# Patient Record
Sex: FEMALE | Race: BLACK OR AFRICAN AMERICAN | NOT HISPANIC OR LATINO | URBAN - METROPOLITAN AREA
[De-identification: names, ages, dates, MRNs, and addresses within clinical notes are randomized per-mention and may not be internally consistent; named-entity substitution may affect disease eponyms.]

---

## 2020-06-04 ENCOUNTER — OFFICE VISIT (OUTPATIENT)
Dept: URBAN - METROPOLITAN AREA CLINIC 84 | Facility: CLINIC | Age: 75
End: 2020-06-04
Payer: MEDICARE

## 2020-06-04 DIAGNOSIS — K21.9 GERD: ICD-10-CM

## 2020-06-04 DIAGNOSIS — R13.10 DYSPHAGIA: ICD-10-CM

## 2020-06-04 DIAGNOSIS — K59.01 SLOW TRANSIT CONSTIPATION: ICD-10-CM

## 2020-06-04 DIAGNOSIS — R10.13 EPIGASTRIC ABDOMINAL PAIN: ICD-10-CM

## 2020-06-04 PROCEDURE — G9903 PT SCRN TBCO ID AS NON USER: HCPCS | Performed by: INTERNAL MEDICINE

## 2020-06-04 PROCEDURE — 99214 OFFICE O/P EST MOD 30 MIN: CPT | Performed by: INTERNAL MEDICINE

## 2020-06-04 PROCEDURE — G8427 DOCREV CUR MEDS BY ELIG CLIN: HCPCS | Performed by: INTERNAL MEDICINE

## 2020-06-04 PROCEDURE — 1036F TOBACCO NON-USER: CPT | Performed by: INTERNAL MEDICINE

## 2020-06-04 PROCEDURE — G8417 CALC BMI ABV UP PARAM F/U: HCPCS | Performed by: INTERNAL MEDICINE

## 2020-06-04 RX ORDER — ASPIRIN 81 MG/1
CHEW 1 TABLET (81 MG) BY ORAL ROUTE ONCE DAILY TABLET, CHEWABLE ORAL 1
Qty: 0 | Refills: 0 | Status: ACTIVE | COMMUNITY
Start: 1900-01-01 | End: 1900-01-01

## 2020-06-04 RX ORDER — AMLODIPINE BESYLATE 2.5 MG/1
TAKE 1 TABLET (2.5 MG) BY ORAL ROUTE ONCE DAILY TABLET ORAL 1
Qty: 0 | Refills: 0 | Status: ACTIVE | COMMUNITY
Start: 1900-01-01 | End: 1900-01-01

## 2020-06-04 RX ORDER — ESTROGENS, CONJUGATED 0.62 MG/1
TAKE 1 TABLET (0.625 MG) BY ORAL ROUTE ONCE DAILY TABLET, FILM COATED ORAL 1
Qty: 0 | Refills: 0 | Status: ACTIVE | COMMUNITY
Start: 1900-01-01 | End: 1900-01-01

## 2020-06-04 RX ORDER — DEXLANSOPRAZOLE 60 MG/1
TAKE 1 CAPSULE (60 MG) BY ORAL ROUTE ONCE DAILY CAPSULE, DELAYED RELEASE ORAL 1
Qty: 0 | Refills: 0 | Status: ACTIVE | COMMUNITY
Start: 1900-01-01 | End: 1900-01-01

## 2020-06-04 RX ORDER — ATORVASTATIN CALCIUM 20 MG/1
TAKE 1 TABLET (20 MG) BY ORAL ROUTE ONCE DAILY TABLET, FILM COATED ORAL 1
Qty: 0 | Refills: 0 | Status: ACTIVE | COMMUNITY
Start: 1900-01-01 | End: 1900-01-01

## 2020-06-04 RX ORDER — LINACLOTIDE 145 UG/1
TAKE ONE CAPSULE BY MOUTH EVERY DAY ON AN EMPTY STOMACH AT LEAST 30 MINUTES BEFORE FIRST MEAL OF THE DAY CAPSULE, GELATIN COATED ORAL
Qty: 90 | Refills: 1 | Status: ACTIVE | COMMUNITY
Start: 2018-10-15 | End: 1900-01-01

## 2020-06-04 NOTE — HPI-OTHER HISTORIES
Oct 2019 - Dysphagia has improved with change in diet. Continues dexilant once daily. Linzess helps with constipation. No abdominal pain. Regular bowels. Stable weight. Labs  Sept 2019: CR 1. CT abdominal with contrast Sept 2019: No acute inflammatory process within the abdomen or pelvis. Atrophied right kidney for which she is scheduled to see a urologist.  EGD Sept 2019: - Esophagogastric landmarks identified. - No gross lesions in esophagus. Dilated 18 mm. - No gross lesions in esophagus. Biopsied. Path revealed lichenoid mucositis. - No gross lesions in the stomach. Biopsied. Path revealed PPI therapy effect. - Normal examined duodenum.   Sept 2019 - Four days ago acquired acute onset upper abdominal discomfort which lasted for 6 hours and then resolved. Eating foods causes discomfort. Brown stools. Remains on PPI. No recent NSAID use.   March 2019 - on dexilant for gerd. takes linzess 145 for constipation. no new symptoms. has tried to stop PPI but this causes severe gerd related symptoms.   NOV 2018 - EGD Oct 2018: normal. Gastric biopsy revealed intestinal metaplasia. Reflux esophagitis present. US abdomen: atrophic right kidney, gallbladder is normal, no stones. Patient remains on Dexilant despite which gets break through heartburn. She also reports episodic abdominal discomfort. She continues to remain on Linzess.   JULY 2018 - Patient was admitted to [facility] July 2018 with chest pain. She was near syncope. She underwent cardiac work-up which was negative. Labs: hgb 13, LFT's normal. JAN 2018 - Patient is presently on Dexilant 30 mg with relief in GERD. She is also using Linzess 145 mcg daily with good relief in constipation and no rectal bleeding at present time.  11/2017 - All three hemorrhoid plexus have been banded. Linzess 72 mcg was ineffective and therefore was increased to 145 mcg. Patient reports 1 episode of copious rectal bleeding. Improved bowel frequency with linzess use but occasionally no bowel movements despite taking medication. Heartburn has worsened but ran out of medications (Dexilant).   09/2017 - Previously banded left lateral and right posterior. Here for repeat banding. Linzess 72 mcg is not helping with constipation.   08/2017 - Patient reports intermittent heartburn. She is on Dexilant 30 mg once daily. She reports frequent abdominal bloating and has chronic constipation for which she uses Miralax 2-3 times a day. Patient is interested in hemorrhoid banding procedure.   03/2017 - The patient has been experiencing these symptoms for years. The heartburn lasts a variable amount of time. The patient has used medications to relieve the heartburn. Medications used include Dexilant with adequate relief. Current medications include Dexilant 60 mg oral capsule,biphase delayed releas. She has had extensive workup in the past which includes EGD in 2012, H Pylori infection s/p treatment, HBT - no SIBO, US Abdomen - normal. CT Abdomen without contrast - normal. Celiac panel - negative.   Esophageal manometry 2010 - also normal. Patient reports intolerance to Carfate in past. Adding Zantac also did not help with symptoms. Symptoms were refractory to Nexium 40 mg. However Dexilant 60 mg seems to be helping.   Nov 2015 EGD with 48 hr B[facility] PH monitoring revealed - Mild chronic gastritis, No H Pylori. No esophagitis. But Bravo Ph monitoring confirmed prescence of acid reflux. This was done when pt was off PPI therapy ( though pt was instructed to continue all meds during test she stopped PPI therapy 2 weeks prior to procedure) Constipation is wellcontrolled with Miralax once daily.   Colonoscopy March 2016 - poor prep Colonoscopy May 2016 - Internal hemorrhoids that prolapse with straining, but spontaneously regress to the resting position (Grade II) found on perianal exam. - Nonbleeding internal hemorrhoids. - No specimens collected Heartburn is well controlled with Dexilant 60 mg. She reports abdominal distension and bloating especially after meals.

## 2020-06-04 NOTE — PHYSICAL EXAM CONSTITUTIONAL:
well developed, well nourished , in no acute distress , ambulating without difficulty , normal communication ability no

## 2020-06-04 NOTE — HPI-TODAY'S VISIT:
feels dysphagia, epigastric discomfort. chest spasms. not always with eating. also gets shortness of breath after eating. remains on dexilant. regular bowels. using linzess.

## 2020-06-15 ENCOUNTER — TELEPHONE ENCOUNTER (OUTPATIENT)
Dept: URBAN - METROPOLITAN AREA CLINIC 25 | Facility: CLINIC | Age: 75
End: 2020-06-15

## 2020-06-17 ENCOUNTER — OFFICE VISIT (OUTPATIENT)
Dept: URBAN - METROPOLITAN AREA SURGERY CENTER 16 | Facility: SURGERY CENTER | Age: 75
End: 2020-06-17
Payer: MEDICARE

## 2020-06-17 ENCOUNTER — TELEPHONE ENCOUNTER (OUTPATIENT)
Dept: URBAN - METROPOLITAN AREA CLINIC 92 | Facility: CLINIC | Age: 75
End: 2020-06-17

## 2020-06-17 ENCOUNTER — CLAIMS CREATED FROM THE CLAIM WINDOW (OUTPATIENT)
Dept: URBAN - METROPOLITAN AREA CLINIC 4 | Facility: CLINIC | Age: 75
End: 2020-06-17
Payer: MEDICARE

## 2020-06-17 DIAGNOSIS — R13.19 CERVICAL DYSPHAGIA: ICD-10-CM

## 2020-06-17 DIAGNOSIS — B37.81 CANDIDA ESOPHAGITIS: ICD-10-CM

## 2020-06-17 DIAGNOSIS — B37.81 CANDIDAL ESOPHAGITIS: ICD-10-CM

## 2020-06-17 PROCEDURE — G8907 PT DOC NO EVENTS ON DISCHARG: HCPCS | Performed by: INTERNAL MEDICINE

## 2020-06-17 PROCEDURE — 43249 ESOPH EGD DILATION <30 MM: CPT | Performed by: INTERNAL MEDICINE

## 2020-06-17 PROCEDURE — 88305 TISSUE EXAM BY PATHOLOGIST: CPT | Performed by: PATHOLOGY

## 2020-06-17 PROCEDURE — 43239 EGD BIOPSY SINGLE/MULTIPLE: CPT | Performed by: INTERNAL MEDICINE

## 2020-06-17 PROCEDURE — 88312 SPECIAL STAINS GROUP 1: CPT | Performed by: PATHOLOGY

## 2020-06-17 RX ORDER — DEXLANSOPRAZOLE 60 MG/1
TAKE 1 CAPSULE (60 MG) BY ORAL ROUTE ONCE DAILY CAPSULE, DELAYED RELEASE ORAL 1
Qty: 0 | Refills: 0 | Status: ACTIVE | COMMUNITY
Start: 1900-01-01 | End: 1900-01-01

## 2020-06-17 RX ORDER — ESTROGENS, CONJUGATED 0.62 MG/1
TAKE 1 TABLET (0.625 MG) BY ORAL ROUTE ONCE DAILY TABLET, FILM COATED ORAL 1
Qty: 0 | Refills: 0 | Status: ACTIVE | COMMUNITY
Start: 1900-01-01 | End: 1900-01-01

## 2020-06-17 RX ORDER — ASPIRIN 81 MG/1
CHEW 1 TABLET (81 MG) BY ORAL ROUTE ONCE DAILY TABLET, CHEWABLE ORAL 1
Qty: 0 | Refills: 0 | Status: ACTIVE | COMMUNITY
Start: 1900-01-01 | End: 1900-01-01

## 2020-06-17 RX ORDER — LINACLOTIDE 145 UG/1
TAKE ONE CAPSULE BY MOUTH EVERY DAY ON AN EMPTY STOMACH AT LEAST 30 MINUTES BEFORE FIRST MEAL OF THE DAY CAPSULE, GELATIN COATED ORAL
Qty: 90 | Refills: 1 | Status: ACTIVE | COMMUNITY
Start: 2018-10-15 | End: 1900-01-01

## 2020-06-17 RX ORDER — ATORVASTATIN CALCIUM 20 MG/1
TAKE 1 TABLET (20 MG) BY ORAL ROUTE ONCE DAILY TABLET, FILM COATED ORAL 1
Qty: 0 | Refills: 0 | Status: ACTIVE | COMMUNITY
Start: 1900-01-01 | End: 1900-01-01

## 2020-06-17 RX ORDER — SUCRALFATE 1 G/10ML
10 ML ON AN EMPTY STOMACH SUSPENSION ORAL
Qty: 1200 ML | Refills: 2 | OUTPATIENT
Start: 2020-06-17 | End: 2020-09-15

## 2020-06-17 RX ORDER — AMLODIPINE BESYLATE 2.5 MG/1
TAKE 1 TABLET (2.5 MG) BY ORAL ROUTE ONCE DAILY TABLET ORAL 1
Qty: 0 | Refills: 0 | Status: ACTIVE | COMMUNITY
Start: 1900-01-01 | End: 1900-01-01

## 2020-06-22 ENCOUNTER — TELEPHONE ENCOUNTER (OUTPATIENT)
Dept: URBAN - METROPOLITAN AREA CLINIC 92 | Facility: CLINIC | Age: 75
End: 2020-06-22

## 2020-06-22 RX ORDER — FLUCONAZOLE 200 MG/1
1 TABLET TABLET ORAL ONCE A DAY
Qty: 14 TABLET | Refills: 0 | OUTPATIENT
Start: 2020-06-22 | End: 2020-07-06

## 2021-04-06 ENCOUNTER — TELEPHONE ENCOUNTER (OUTPATIENT)
Dept: URBAN - METROPOLITAN AREA CLINIC 25 | Facility: CLINIC | Age: 76
End: 2021-04-06

## 2021-04-07 ENCOUNTER — OFFICE VISIT (OUTPATIENT)
Dept: URBAN - METROPOLITAN AREA CLINIC 124 | Facility: CLINIC | Age: 76
End: 2021-04-07
Payer: MEDICARE

## 2021-04-07 ENCOUNTER — LAB OUTSIDE AN ENCOUNTER (OUTPATIENT)
Dept: URBAN - METROPOLITAN AREA CLINIC 124 | Facility: CLINIC | Age: 76
End: 2021-04-07

## 2021-04-07 DIAGNOSIS — R13.10 DYSPHAGIA: ICD-10-CM

## 2021-04-07 DIAGNOSIS — K21.9 GERD: ICD-10-CM

## 2021-04-07 DIAGNOSIS — K59.01 SLOW TRANSIT CONSTIPATION: ICD-10-CM

## 2021-04-07 DIAGNOSIS — B37.81 CANDIDAL ESOPHAGITIS: ICD-10-CM

## 2021-04-07 DIAGNOSIS — R10.13 EPIGASTRIC ABDOMINAL PAIN: ICD-10-CM

## 2021-04-07 PROCEDURE — 99214 OFFICE O/P EST MOD 30 MIN: CPT | Performed by: INTERNAL MEDICINE

## 2021-04-07 RX ORDER — ESTROGENS, CONJUGATED 0.62 MG/1
TAKE 1 TABLET (0.625 MG) BY ORAL ROUTE ONCE DAILY TABLET, FILM COATED ORAL 1
Qty: 0 | Refills: 0 | Status: ACTIVE | COMMUNITY
Start: 1900-01-01

## 2021-04-07 RX ORDER — DEXLANSOPRAZOLE 60 MG/1
TAKE 1 CAPSULE (60 MG) BY ORAL ROUTE ONCE DAILY CAPSULE, DELAYED RELEASE ORAL 1
Qty: 0 | Refills: 0 | Status: ACTIVE | COMMUNITY
Start: 1900-01-01

## 2021-04-07 RX ORDER — AMLODIPINE BESYLATE 2.5 MG/1
TAKE 1 TABLET (2.5 MG) BY ORAL ROUTE ONCE DAILY TABLET ORAL 1
Qty: 0 | Refills: 0 | Status: ACTIVE | COMMUNITY
Start: 1900-01-01

## 2021-04-07 RX ORDER — LINACLOTIDE 145 UG/1
TAKE ONE CAPSULE BY MOUTH EVERY DAY ON AN EMPTY STOMACH AT LEAST 30 MINUTES BEFORE FIRST MEAL OF THE DAY CAPSULE, GELATIN COATED ORAL
Qty: 90 | Refills: 1 | Status: ACTIVE | COMMUNITY
Start: 2018-10-15

## 2021-04-07 RX ORDER — SUCRALFATE 1 G/10ML
10 ML ON AN EMPTY STOMACH SUSPENSION ORAL
Qty: 1200 ML | Refills: 2 | OUTPATIENT
Start: 2021-04-07 | End: 2021-07-06

## 2021-04-07 RX ORDER — ASPIRIN 81 MG/1
CHEW 1 TABLET (81 MG) BY ORAL ROUTE ONCE DAILY TABLET, CHEWABLE ORAL 1
Qty: 0 | Refills: 0 | Status: ACTIVE | COMMUNITY
Start: 1900-01-01

## 2021-04-07 RX ORDER — ATORVASTATIN CALCIUM 20 MG/1
TAKE 1 TABLET (20 MG) BY ORAL ROUTE ONCE DAILY TABLET, FILM COATED ORAL 1
Qty: 0 | Refills: 0 | Status: ACTIVE | COMMUNITY
Start: 1900-01-01

## 2021-04-07 RX ORDER — FLUCONAZOLE 200 MG/1
1 TABLET TABLET ORAL ONCE A DAY
Qty: 14 TABLET | Refills: 0 | OUTPATIENT
Start: 2021-04-07 | End: 2021-04-21

## 2021-04-07 NOTE — HPI-TODAY'S VISIT:
april 2021: burning even after drinking water. frequent regurgitation. on dexilant 60 mg. feels dysphagia upon any oral intake.   EGD in June 2020 revealed no gross lesions in the entire esophagus, dilated to 18 mm.  Path showed Candida esophagitis  seeing cardiologist dr fonseca for chf. cant recall name of meds. on diuretics. no nsaid use.

## 2021-04-07 NOTE — HPI-OTHER HISTORIES
June 2020:  feels dysphagia, epigastric discomfort. chest spasms. not always with eating. also gets shortness of breath after eating. remains on dexilant. regular bowels. using linzess.  Oct 2019 - Dysphagia has improved with change in diet. Continues dexilant once daily. Linzess helps with constipation. No abdominal pain. Regular bowels. Stable weight. Labs  Sept 2019: CR 1. CT abdominal with contrast Sept 2019: No acute inflammatory process within the abdomen or pelvis. Atrophied right kidney for which she is scheduled to see a urologist.  EGD Sept 2019: - Esophagogastric landmarks identified. - No gross lesions in esophagus. Dilated 18 mm. - No gross lesions in esophagus. Biopsied. Path revealed lichenoid mucositis. - No gross lesions in the stomach. Biopsied. Path revealed PPI therapy effect. - Normal examined duodenum.   Sept 2019 - Four days ago acquired acute onset upper abdominal discomfort which lasted for 6 hours and then resolved. Eating foods causes discomfort. Brown stools. Remains on PPI. No recent NSAID use.   March 2019 - on dexilant for gerd. takes linzess 145 for constipation. no new symptoms. has tried to stop PPI but this causes severe gerd related symptoms.   NOV 2018 - EGD Oct 2018: normal. Gastric biopsy revealed intestinal metaplasia. Reflux esophagitis present. US abdomen: atrophic right kidney, gallbladder is normal, no stones. Patient remains on Dexilant despite which gets break through heartburn. She also reports episodic abdominal discomfort. She continues to remain on Linzess.   JULY 2018 - Patient was admitted to [facility] July 2018 with chest pain. She was near syncope. She underwent cardiac work-up which was negative. Labs: hgb 13, LFT's normal. JAN 2018 - Patient is presently on Dexilant 30 mg with relief in GERD. She is also using Linzess 145 mcg daily with good relief in constipation and no rectal bleeding at present time.  11/2017 - All three hemorrhoid plexus have been banded. Linzess 72 mcg was ineffective and therefore was increased to 145 mcg. Patient reports 1 episode of copious rectal bleeding. Improved bowel frequency with linzess use but occasionally no bowel movements despite taking medication. Heartburn has worsened but ran out of medications (Dexilant).   09/2017 - Previously banded left lateral and right posterior. Here for repeat banding. Linzess 72 mcg is not helping with constipation.   08/2017 - Patient reports intermittent heartburn. She is on Dexilant 30 mg once daily. She reports frequent abdominal bloating and has chronic constipation for which she uses Miralax 2-3 times a day. Patient is interested in hemorrhoid banding procedure.   03/2017 - The patient has been experiencing these symptoms for years. The heartburn lasts a variable amount of time. The patient has used medications to relieve the heartburn. Medications used include Dexilant with adequate relief. Current medications include Dexilant 60 mg oral capsule,biphase delayed releas. She has had extensive workup in the past which includes EGD in 2012, H Pylori infection s/p treatment, HBT - no SIBO, US Abdomen - normal. CT Abdomen without contrast - normal. Celiac panel - negative.   Esophageal manometry 2010 - also normal. Patient reports intolerance to Carfate in past. Adding Zantac also did not help with symptoms. Symptoms were refractory to Nexium 40 mg. However Dexilant 60 mg seems to be helping.   Nov 2015 EGD with 48 hr [facility] PH monitoring revealed - Mild chronic gastritis, No H Pylori. No esophagitis. But Bravo Ph monitoring confirmed prescence of acid reflux. This was done when pt was off PPI therapy ( though pt was instructed to continue all meds during test she stopped PPI therapy 2 weeks prior to procedure) Constipation is wellcontrolled with Miralax once daily.   Colonoscopy March 2016 - poor prep Colonoscopy May 2016 - Internal hemorrhoids that prolapse with straining, but spontaneously regress to the resting position (Grade II) found on perianal exam. - Nonbleeding internal hemorrhoids. - No specimens collected Heartburn is well controlled with Dexilant 60 mg. She reports abdominal distension and bloating especially after meals.

## 2021-04-15 ENCOUNTER — CLAIMS CREATED FROM THE CLAIM WINDOW (OUTPATIENT)
Dept: URBAN - METROPOLITAN AREA CLINIC 4 | Facility: CLINIC | Age: 76
End: 2021-04-15
Payer: MEDICARE

## 2021-04-15 ENCOUNTER — LAB OUTSIDE AN ENCOUNTER (OUTPATIENT)
Dept: URBAN - METROPOLITAN AREA CLINIC 92 | Facility: CLINIC | Age: 76
End: 2021-04-15

## 2021-04-15 ENCOUNTER — OFFICE VISIT (OUTPATIENT)
Dept: URBAN - METROPOLITAN AREA SURGERY CENTER 20 | Facility: SURGERY CENTER | Age: 76
End: 2021-04-15
Payer: MEDICARE

## 2021-04-15 ENCOUNTER — TELEPHONE ENCOUNTER (OUTPATIENT)
Dept: URBAN - METROPOLITAN AREA CLINIC 92 | Facility: CLINIC | Age: 76
End: 2021-04-15

## 2021-04-15 DIAGNOSIS — K31.89 ACQUIRED DEFORMITY OF DUODENUM: ICD-10-CM

## 2021-04-15 DIAGNOSIS — K29.60 OTHER GASTRITIS WITHOUT BLEEDING: ICD-10-CM

## 2021-04-15 DIAGNOSIS — K21.00 GASTRO-ESOPHAGEAL REFLUX DISEASE WITH ESOPHAGITIS, WITHOUT BLEEDING: ICD-10-CM

## 2021-04-15 DIAGNOSIS — K21.9 ACID REFLUX: ICD-10-CM

## 2021-04-15 DIAGNOSIS — R13.19 CERVICAL DYSPHAGIA: ICD-10-CM

## 2021-04-15 PROBLEM — 40890009: Status: ACTIVE | Noted: 2021-04-15

## 2021-04-15 PROCEDURE — 43249 ESOPH EGD DILATION <30 MM: CPT | Performed by: INTERNAL MEDICINE

## 2021-04-15 PROCEDURE — 43239 EGD BIOPSY SINGLE/MULTIPLE: CPT | Performed by: INTERNAL MEDICINE

## 2021-04-15 PROCEDURE — G8907 PT DOC NO EVENTS ON DISCHARG: HCPCS | Performed by: INTERNAL MEDICINE

## 2021-04-15 PROCEDURE — 88305 TISSUE EXAM BY PATHOLOGIST: CPT | Performed by: PATHOLOGY

## 2021-04-15 PROCEDURE — 88312 SPECIAL STAINS GROUP 1: CPT | Performed by: PATHOLOGY

## 2021-04-15 RX ORDER — ASPIRIN 81 MG/1
CHEW 1 TABLET (81 MG) BY ORAL ROUTE ONCE DAILY TABLET, CHEWABLE ORAL 1
Qty: 0 | Refills: 0 | Status: ACTIVE | COMMUNITY
Start: 1900-01-01

## 2021-04-15 RX ORDER — FLUCONAZOLE 200 MG/1
1 TABLET TABLET ORAL ONCE A DAY
Qty: 14 TABLET | Refills: 0 | Status: ACTIVE | COMMUNITY
Start: 2021-04-07 | End: 2021-04-21

## 2021-04-15 RX ORDER — DEXLANSOPRAZOLE 60 MG/1
TAKE 1 CAPSULE (60 MG) BY ORAL ROUTE ONCE DAILY CAPSULE, DELAYED RELEASE ORAL 1
Qty: 0 | Refills: 0 | Status: ACTIVE | COMMUNITY
Start: 1900-01-01

## 2021-04-15 RX ORDER — SUCRALFATE 1 G/1
1 TABLET - CRUSH AND MIX WITH 2 TEA SPOONS OF WATER TO MAKE A SLURRY TABLET ORAL
Qty: 120 TABLETS | Refills: 2 | OUTPATIENT
Start: 2021-04-15 | End: 2021-07-14

## 2021-04-15 RX ORDER — SUCRALFATE 1 G/10ML
10 ML ON AN EMPTY STOMACH SUSPENSION ORAL
Qty: 1200 ML | Refills: 2 | Status: ACTIVE | COMMUNITY
Start: 2021-04-07 | End: 2021-07-06

## 2021-04-15 RX ORDER — AMLODIPINE BESYLATE 2.5 MG/1
TAKE 1 TABLET (2.5 MG) BY ORAL ROUTE ONCE DAILY TABLET ORAL 1
Qty: 0 | Refills: 0 | Status: ACTIVE | COMMUNITY
Start: 1900-01-01

## 2021-04-15 RX ORDER — LINACLOTIDE 145 UG/1
TAKE ONE CAPSULE BY MOUTH EVERY DAY ON AN EMPTY STOMACH AT LEAST 30 MINUTES BEFORE FIRST MEAL OF THE DAY CAPSULE, GELATIN COATED ORAL
Qty: 90 | Refills: 1 | Status: ACTIVE | COMMUNITY
Start: 2018-10-15

## 2021-04-15 RX ORDER — ESTROGENS, CONJUGATED 0.62 MG/1
TAKE 1 TABLET (0.625 MG) BY ORAL ROUTE ONCE DAILY TABLET, FILM COATED ORAL 1
Qty: 0 | Refills: 0 | Status: ACTIVE | COMMUNITY
Start: 1900-01-01

## 2021-04-15 RX ORDER — ATORVASTATIN CALCIUM 20 MG/1
TAKE 1 TABLET (20 MG) BY ORAL ROUTE ONCE DAILY TABLET, FILM COATED ORAL 1
Qty: 0 | Refills: 0 | Status: ACTIVE | COMMUNITY
Start: 1900-01-01

## 2021-04-19 ENCOUNTER — TELEPHONE ENCOUNTER (OUTPATIENT)
Dept: URBAN - METROPOLITAN AREA CLINIC 92 | Facility: CLINIC | Age: 76
End: 2021-04-19

## 2021-04-30 ENCOUNTER — OFFICE VISIT (OUTPATIENT)
Dept: URBAN - METROPOLITAN AREA CLINIC 25 | Facility: CLINIC | Age: 76
End: 2021-04-30

## 2021-08-03 ENCOUNTER — TELEPHONE ENCOUNTER (OUTPATIENT)
Dept: URBAN - METROPOLITAN AREA CLINIC 25 | Facility: CLINIC | Age: 76
End: 2021-08-03

## 2021-08-03 RX ORDER — SODIUM, POTASSIUM,MAG SULFATES 17.5-3.13G
1 KIT SOLUTION, RECONSTITUTED, ORAL ORAL
Qty: 1 KIT (354ML) | Refills: 0 | OUTPATIENT
Start: 2021-08-03 | End: 2021-08-04

## 2021-08-17 ENCOUNTER — OFFICE VISIT (OUTPATIENT)
Dept: URBAN - METROPOLITAN AREA SURGERY CENTER 20 | Facility: SURGERY CENTER | Age: 76
End: 2021-08-17
Payer: MEDICARE

## 2021-08-17 ENCOUNTER — CLAIMS CREATED FROM THE CLAIM WINDOW (OUTPATIENT)
Dept: URBAN - METROPOLITAN AREA CLINIC 4 | Facility: CLINIC | Age: 76
End: 2021-08-17
Payer: MEDICARE

## 2021-08-17 DIAGNOSIS — K63.5 BENIGN COLON POLYP: ICD-10-CM

## 2021-08-17 DIAGNOSIS — K63.89 COLONIC PSEUDOMELANOSIS: ICD-10-CM

## 2021-08-17 DIAGNOSIS — Z12.11 COLON CANCER SCREENING: ICD-10-CM

## 2021-08-17 PROCEDURE — G8907 PT DOC NO EVENTS ON DISCHARG: HCPCS | Performed by: INTERNAL MEDICINE

## 2021-08-17 PROCEDURE — 45385 COLONOSCOPY W/LESION REMOVAL: CPT | Performed by: INTERNAL MEDICINE

## 2021-08-17 PROCEDURE — 88305 TISSUE EXAM BY PATHOLOGIST: CPT | Performed by: PATHOLOGY

## 2021-08-17 RX ORDER — AMLODIPINE BESYLATE 2.5 MG/1
TAKE 1 TABLET (2.5 MG) BY ORAL ROUTE ONCE DAILY TABLET ORAL 1
Qty: 0 | Refills: 0 | Status: ACTIVE | COMMUNITY
Start: 1900-01-01

## 2021-08-17 RX ORDER — ESTROGENS, CONJUGATED 0.62 MG/1
TAKE 1 TABLET (0.625 MG) BY ORAL ROUTE ONCE DAILY TABLET, FILM COATED ORAL 1
Qty: 0 | Refills: 0 | Status: ACTIVE | COMMUNITY
Start: 1900-01-01

## 2021-08-17 RX ORDER — LINACLOTIDE 145 UG/1
TAKE ONE CAPSULE BY MOUTH EVERY DAY ON AN EMPTY STOMACH AT LEAST 30 MINUTES BEFORE FIRST MEAL OF THE DAY CAPSULE, GELATIN COATED ORAL
Qty: 90 | Refills: 1 | Status: ACTIVE | COMMUNITY
Start: 2018-10-15

## 2021-08-17 RX ORDER — ASPIRIN 81 MG/1
CHEW 1 TABLET (81 MG) BY ORAL ROUTE ONCE DAILY TABLET, CHEWABLE ORAL 1
Qty: 0 | Refills: 0 | Status: ACTIVE | COMMUNITY
Start: 1900-01-01

## 2021-08-17 RX ORDER — ATORVASTATIN CALCIUM 20 MG/1
TAKE 1 TABLET (20 MG) BY ORAL ROUTE ONCE DAILY TABLET, FILM COATED ORAL 1
Qty: 0 | Refills: 0 | Status: ACTIVE | COMMUNITY
Start: 1900-01-01

## 2021-08-17 RX ORDER — DEXLANSOPRAZOLE 60 MG/1
TAKE 1 CAPSULE (60 MG) BY ORAL ROUTE ONCE DAILY CAPSULE, DELAYED RELEASE ORAL 1
Qty: 0 | Refills: 0 | Status: ACTIVE | COMMUNITY
Start: 1900-01-01

## 2021-08-24 ENCOUNTER — TELEPHONE ENCOUNTER (OUTPATIENT)
Dept: URBAN - METROPOLITAN AREA CLINIC 92 | Facility: CLINIC | Age: 76
End: 2021-08-24

## 2022-03-31 ENCOUNTER — WEB ENCOUNTER (OUTPATIENT)
Dept: URBAN - METROPOLITAN AREA CLINIC 25 | Facility: CLINIC | Age: 77
End: 2022-03-31

## 2022-04-04 ENCOUNTER — TELEPHONE ENCOUNTER (OUTPATIENT)
Dept: URBAN - METROPOLITAN AREA CLINIC 92 | Facility: CLINIC | Age: 77
End: 2022-04-04

## 2022-04-04 ENCOUNTER — OFFICE VISIT (OUTPATIENT)
Dept: URBAN - METROPOLITAN AREA CLINIC 25 | Facility: CLINIC | Age: 77
End: 2022-04-04
Payer: MEDICARE

## 2022-04-04 ENCOUNTER — LAB OUTSIDE AN ENCOUNTER (OUTPATIENT)
Dept: URBAN - METROPOLITAN AREA CLINIC 25 | Facility: CLINIC | Age: 77
End: 2022-04-04

## 2022-04-04 DIAGNOSIS — K21.9 GERD: ICD-10-CM

## 2022-04-04 DIAGNOSIS — K59.04 CHRONIC IDIOPATHIC CONSTIPATION: ICD-10-CM

## 2022-04-04 DIAGNOSIS — R10.13 EPIGASTRIC ABDOMINAL PAIN: ICD-10-CM

## 2022-04-04 DIAGNOSIS — R13.10 DYSPHAGIA: ICD-10-CM

## 2022-04-04 DIAGNOSIS — B37.81 CANDIDAL ESOPHAGITIS: ICD-10-CM

## 2022-04-04 DIAGNOSIS — Z86.010 PERSONAL HISTORY OF COLONIC POLYPS: ICD-10-CM

## 2022-04-04 PROBLEM — 35298007 SLOW TRANSIT CONSTIPATION: Status: ACTIVE | Noted: 2020-06-04

## 2022-04-04 PROCEDURE — 99214 OFFICE O/P EST MOD 30 MIN: CPT | Performed by: INTERNAL MEDICINE

## 2022-04-04 RX ORDER — ALBUTEROL SULFATE 90 UG/1
1 PUFF AS NEEDED AEROSOL, METERED RESPIRATORY (INHALATION)
Status: ACTIVE | COMMUNITY

## 2022-04-04 RX ORDER — SPIRONOLACTONE AND HYDROCHLOROTHIAZIDE 25; 25 MG/1; MG/1
1 TABLET TABLET ORAL ONCE A DAY
Status: ACTIVE | COMMUNITY

## 2022-04-04 RX ORDER — ATENOLOL 25 MG/1
1 TABLET TABLET ORAL ONCE A DAY
Status: ACTIVE | COMMUNITY

## 2022-04-04 RX ORDER — ESTROGENS, CONJUGATED 0.62 MG/1
TAKE 1 TABLET (0.625 MG) BY ORAL ROUTE ONCE DAILY TABLET, FILM COATED ORAL 1
Qty: 0 | Refills: 0 | Status: ACTIVE | COMMUNITY
Start: 1900-01-01

## 2022-04-04 RX ORDER — DEXLANSOPRAZOLE 60 MG/1
TAKE 1 CAPSULE (60 MG) BY ORAL ROUTE ONCE DAILY CAPSULE, DELAYED RELEASE ORAL 1
Qty: 0 | Refills: 0 | Status: ACTIVE | COMMUNITY
Start: 1900-01-01

## 2022-04-04 RX ORDER — ASPIRIN 81 MG/1
CHEW 1 TABLET (81 MG) BY ORAL ROUTE ONCE DAILY TABLET, CHEWABLE ORAL 1
Qty: 0 | Refills: 0 | Status: ACTIVE | COMMUNITY
Start: 1900-01-01

## 2022-04-04 RX ORDER — LINACLOTIDE 145 UG/1
TAKE ONE CAPSULE BY MOUTH EVERY DAY ON AN EMPTY STOMACH AT LEAST 30 MINUTES BEFORE FIRST MEAL OF THE DAY CAPSULE, GELATIN COATED ORAL
Qty: 90 | Refills: 1 | Status: ACTIVE | COMMUNITY
Start: 2018-10-15

## 2022-04-04 RX ORDER — AMLODIPINE BESYLATE 2.5 MG/1
TAKE 1 TABLET (2.5 MG) BY ORAL ROUTE ONCE DAILY TABLET ORAL 1
Qty: 0 | Refills: 0 | Status: ACTIVE | COMMUNITY
Start: 1900-01-01

## 2022-04-04 RX ORDER — ATORVASTATIN CALCIUM 20 MG/1
TAKE 1 TABLET (20 MG) BY ORAL ROUTE ONCE DAILY TABLET, FILM COATED ORAL 1
Qty: 0 | Refills: 0 | Status: ACTIVE | COMMUNITY
Start: 1900-01-01

## 2022-04-04 RX ORDER — FOLIC ACID/VIT B COMPLEX AND C 0.8 MG
1 TABLET TABLET ORAL ONCE A DAY
Status: ACTIVE | COMMUNITY

## 2022-04-04 NOTE — HPI-TODAY'S VISIT:
April 2022 visit:   A colonoscopy in August 2021 revealed 3 mm transverse colon polyp, 6 mm transverse colon polyp, repeat colonoscopy advised in 5 years, pathology was benign with no tubular adenoma.  EGD in April 2021 revealed patchy white plaques in the middle esophagus, somewhat tortuous appearing distal esophagus, TTS esophageal balloon dilation performed to 18 mm, gastric biopsy revealed intestinal metaplasia, no H. pylori infection seen, esophageal biopsies confirmed reflux esophagitis with no esophageal Candida.  A barium swallow prior to the EGD in April 2021 revealed narrowing at the GE junction with slight delay in passage of the barium tablet from the esophagus into the stomach with associated tertiary contraction waves of the mid and distal esophagus, no significant hiatal hernia or reflux seen.  on dexilant 60 mg. symptoms were better after previous egd in 2021. but since 2 months having worsening symptoms. continue to have heartburn. also describes epigastric burning. Pulmonologist dx with Eosinophilic asthma. mild episodic dysphagia.  Esophageal manometry was recommended in 2021 but not done by pt.

## 2022-04-04 NOTE — HPI-OTHER HISTORIES
april 2021: burning even after drinking water. frequent regurgitation. on dexilant 60 mg. feels dysphagia upon any oral intake.   EGD in June 2020 revealed no gross lesions in the entire esophagus, dilated to 18 mm.  Path showed Candida esophagitis  seeing cardiologist dr fonseca for chf. cant recall name of meds. on diuretics. no nsaid use.  June 2020:  feels dysphagia, epigastric discomfort. chest spasms. not always with eating. also gets shortness of breath after eating. remains on dexilant. regular bowels. using linzess.  Oct 2019 - Dysphagia has improved with change in diet. Continues dexilant once daily. Linzess helps with constipation. No abdominal pain. Regular bowels. Stable weight. Labs  Sept 2019: CR 1. CT abdominal with contrast Sept 2019: No acute inflammatory process within the abdomen or pelvis. Atrophied right kidney for which she is scheduled to see a urologist.  EGD Sept 2019: - Esophagogastric landmarks identified. - No gross lesions in esophagus. Dilated 18 mm. - No gross lesions in esophagus. Biopsied. Path revealed lichenoid mucositis. - No gross lesions in the stomach. Biopsied. Path revealed PPI therapy effect. - Normal examined duodenum.   Sept 2019 - Four days ago acquired acute onset upper abdominal discomfort which lasted for 6 hours and then resolved. Eating foods causes discomfort. Brown stools. Remains on PPI. No recent NSAID use.   March 2019 - on dexilant for gerd. takes linzess 145 for constipation. no new symptoms. has tried to stop PPI but this causes severe gerd related symptoms.   NOV 2018 - EGD Oct 2018: normal. Gastric biopsy revealed intestinal metaplasia. Reflux esophagitis present. US abdomen: atrophic right kidney, gallbladder is normal, no stones. Patient remains on Dexilant despite which gets break through heartburn. She also reports episodic abdominal discomfort. She continues to remain on Linzess.   JULY 2018 - Patient was admitted to [facility] July 2018 with chest pain. She was near syncope. She underwent cardiac work-up which was negative. Labs: hgb 13, LFT's normal. JAN 2018 - Patient is presently on Dexilant 30 mg with relief in GERD. She is also using Linzess 145 mcg daily with good relief in constipation and no rectal bleeding at present time.  11/2017 - All three hemorrhoid plexus have been banded. Linzess 72 mcg was ineffective and therefore was increased to 145 mcg. Patient reports 1 episode of copious rectal bleeding. Improved bowel frequency with linzess use but occasionally no bowel movements despite taking medication. Heartburn has worsened but ran out of medications (Dexilant).   09/2017 - Previously banded left lateral and right posterior. Here for repeat banding. Linzess 72 mcg is not helping with constipation.   08/2017 - Patient reports intermittent heartburn. She is on Dexilant 30 mg once daily. She reports frequent abdominal bloating and has chronic constipation for which she uses Miralax 2-3 times a day. Patient is interested in hemorrhoid banding procedure.   03/2017 - The patient has been experiencing these symptoms for years. The heartburn lasts a variable amount of time. The patient has used medications to relieve the heartburn. Medications used include Dexilant with adequate relief. Current medications include Dexilant 60 mg oral capsule,biphase delayed releas. She has had extensive workup in the past which includes EGD in 2012, H Pylori infection s/p treatment, HBT - no SIBO, US Abdomen - normal. CT Abdomen without contrast - normal. Celiac panel - negative.   Esophageal manometry 2010 - also normal. Patient reports intolerance to Carfate in past. Adding Zantac also did not help with symptoms. Symptoms were refractory to Nexium 40 mg. However Dexilant 60 mg seems to be helping.   Nov 2015 EGD with 48 hr [facility] PH monitoring revealed - Mild chronic gastritis, No H Pylori. No esophagitis. But B[facility] Ph monitoring confirmed prescence of acid reflux. This was done when pt was off PPI therapy ( though pt was instructed to continue all meds during test she stopped PPI therapy 2 weeks prior to procedure) Constipation is wellcontrolled with Miralax once daily.   Colonoscopy March 2016 - poor prep Colonoscopy May 2016 - Internal hemorrhoids that prolapse with straining, but spontaneously regress to the resting position (Grade II) found on perianal exam. - Nonbleeding internal hemorrhoids. - No specimens collected Heartburn is well controlled with Dexilant 60 mg. She reports abdominal distension and bloating especially after meals.

## 2022-04-07 ENCOUNTER — OFFICE VISIT (OUTPATIENT)
Dept: URBAN - METROPOLITAN AREA SURGERY CENTER 20 | Facility: SURGERY CENTER | Age: 77
End: 2022-04-07
Payer: MEDICARE

## 2022-04-07 ENCOUNTER — CLAIMS CREATED FROM THE CLAIM WINDOW (OUTPATIENT)
Dept: URBAN - METROPOLITAN AREA CLINIC 4 | Facility: CLINIC | Age: 77
End: 2022-04-07
Payer: MEDICARE

## 2022-04-07 DIAGNOSIS — K31.89 FOCAL FOVEOLAR HYPERPLASIA: ICD-10-CM

## 2022-04-07 DIAGNOSIS — R13.19 CERVICAL DYSPHAGIA: ICD-10-CM

## 2022-04-07 DIAGNOSIS — K21.00 GASTRO-ESOPHAGEAL REFLUX DISEASE WITH ESOPHAGITIS, WITHOUT BLEEDING: ICD-10-CM

## 2022-04-07 DIAGNOSIS — K29.70 GASTRITIS, UNSPECIFIED, WITHOUT BLEEDING: ICD-10-CM

## 2022-04-07 DIAGNOSIS — K31.89 ACQUIRED DEFORMITY OF DUODENUM: ICD-10-CM

## 2022-04-07 DIAGNOSIS — K22.10 BARRETT'S ESOPHAGEAL ULCERATION: ICD-10-CM

## 2022-04-07 PROCEDURE — 43249 ESOPH EGD DILATION <30 MM: CPT | Performed by: INTERNAL MEDICINE

## 2022-04-07 PROCEDURE — G8907 PT DOC NO EVENTS ON DISCHARG: HCPCS | Performed by: INTERNAL MEDICINE

## 2022-04-07 PROCEDURE — 43239 EGD BIOPSY SINGLE/MULTIPLE: CPT | Performed by: INTERNAL MEDICINE

## 2022-04-07 PROCEDURE — 88305 TISSUE EXAM BY PATHOLOGIST: CPT | Performed by: PATHOLOGY

## 2022-04-07 PROCEDURE — 88312 SPECIAL STAINS GROUP 1: CPT | Performed by: PATHOLOGY

## 2022-04-07 RX ORDER — ALBUTEROL SULFATE 90 UG/1
1 PUFF AS NEEDED AEROSOL, METERED RESPIRATORY (INHALATION)
Status: ACTIVE | COMMUNITY

## 2022-04-07 RX ORDER — ESTROGENS, CONJUGATED 0.62 MG/1
TAKE 1 TABLET (0.625 MG) BY ORAL ROUTE ONCE DAILY TABLET, FILM COATED ORAL 1
Qty: 0 | Refills: 0 | Status: ACTIVE | COMMUNITY
Start: 1900-01-01

## 2022-04-07 RX ORDER — LINACLOTIDE 145 UG/1
TAKE ONE CAPSULE BY MOUTH EVERY DAY ON AN EMPTY STOMACH AT LEAST 30 MINUTES BEFORE FIRST MEAL OF THE DAY CAPSULE, GELATIN COATED ORAL
Qty: 90 | Refills: 1 | Status: ACTIVE | COMMUNITY
Start: 2018-10-15

## 2022-04-07 RX ORDER — ASPIRIN 81 MG/1
CHEW 1 TABLET (81 MG) BY ORAL ROUTE ONCE DAILY TABLET, CHEWABLE ORAL 1
Qty: 0 | Refills: 0 | Status: ACTIVE | COMMUNITY
Start: 1900-01-01

## 2022-04-07 RX ORDER — ATORVASTATIN CALCIUM 20 MG/1
TAKE 1 TABLET (20 MG) BY ORAL ROUTE ONCE DAILY TABLET, FILM COATED ORAL 1
Qty: 0 | Refills: 0 | Status: ACTIVE | COMMUNITY
Start: 1900-01-01

## 2022-04-07 RX ORDER — ATENOLOL 25 MG/1
1 TABLET TABLET ORAL ONCE A DAY
Status: ACTIVE | COMMUNITY

## 2022-04-07 RX ORDER — SPIRONOLACTONE AND HYDROCHLOROTHIAZIDE 25; 25 MG/1; MG/1
1 TABLET TABLET ORAL ONCE A DAY
Status: ACTIVE | COMMUNITY

## 2022-04-07 RX ORDER — AMLODIPINE BESYLATE 2.5 MG/1
TAKE 1 TABLET (2.5 MG) BY ORAL ROUTE ONCE DAILY TABLET ORAL 1
Qty: 0 | Refills: 0 | Status: ACTIVE | COMMUNITY
Start: 1900-01-01

## 2022-04-07 RX ORDER — DEXLANSOPRAZOLE 60 MG/1
TAKE 1 CAPSULE (60 MG) BY ORAL ROUTE ONCE DAILY CAPSULE, DELAYED RELEASE ORAL 1
Qty: 0 | Refills: 0 | Status: ACTIVE | COMMUNITY
Start: 1900-01-01

## 2022-04-07 RX ORDER — FOLIC ACID/VIT B COMPLEX AND C 0.8 MG
1 TABLET TABLET ORAL ONCE A DAY
Status: ACTIVE | COMMUNITY

## 2022-04-14 ENCOUNTER — TELEPHONE ENCOUNTER (OUTPATIENT)
Dept: URBAN - METROPOLITAN AREA CLINIC 25 | Facility: CLINIC | Age: 77
End: 2022-04-14

## 2022-04-21 ENCOUNTER — TELEPHONE ENCOUNTER (OUTPATIENT)
Dept: URBAN - METROPOLITAN AREA CLINIC 92 | Facility: CLINIC | Age: 77
End: 2022-04-21

## 2022-04-21 RX ORDER — SUCRALFATE 1 G/10ML
10 ML ON AN EMPTY STOMACH SUSPENSION ORAL
Qty: 1200 ML | Refills: 2 | OUTPATIENT
Start: 2022-04-21 | End: 2022-07-20

## 2022-05-02 ENCOUNTER — LAB OUTSIDE AN ENCOUNTER (OUTPATIENT)
Dept: URBAN - METROPOLITAN AREA CLINIC 25 | Facility: CLINIC | Age: 77
End: 2022-05-02

## 2022-05-02 ENCOUNTER — OFFICE VISIT (OUTPATIENT)
Dept: URBAN - METROPOLITAN AREA CLINIC 25 | Facility: CLINIC | Age: 77
End: 2022-05-02
Payer: MEDICARE

## 2022-05-02 DIAGNOSIS — K59.04 CHRONIC IDIOPATHIC CONSTIPATION: ICD-10-CM

## 2022-05-02 DIAGNOSIS — N18.9 CHRONIC KIDNEY DISEASE, UNSPECIFIED CKD STAGE: ICD-10-CM

## 2022-05-02 DIAGNOSIS — Z86.010 PERSONAL HISTORY OF COLONIC POLYPS: ICD-10-CM

## 2022-05-02 DIAGNOSIS — R10.13 EPIGASTRIC ABDOMINAL PAIN: ICD-10-CM

## 2022-05-02 DIAGNOSIS — K21.00 GASTROESOPHAGEAL REFLUX DISEASE WITH ESOPHAGITIS WITHOUT HEMORRHAGE: ICD-10-CM

## 2022-05-02 PROBLEM — 235595009 GASTROESOPHAGEAL REFLUX DISEASE: Status: ACTIVE | Noted: 2021-04-07

## 2022-05-02 PROBLEM — 40739000 DYSPHAGIA: Status: ACTIVE | Noted: 2022-04-04

## 2022-05-02 PROCEDURE — 99214 OFFICE O/P EST MOD 30 MIN: CPT | Performed by: INTERNAL MEDICINE

## 2022-05-02 RX ORDER — PANTOPRAZOLE SODIUM 40 MG/1
1 TABLET TABLET, DELAYED RELEASE ORAL ONCE A DAY
Qty: 90 | Refills: 2 | OUTPATIENT
Start: 2022-05-02

## 2022-05-02 RX ORDER — SUCRALFATE 1 G/10ML
10 ML ON AN EMPTY STOMACH SUSPENSION ORAL
Qty: 1200 ML | Refills: 2 | Status: ACTIVE | COMMUNITY
Start: 2022-04-21 | End: 2022-07-20

## 2022-05-02 RX ORDER — SPIRONOLACTONE AND HYDROCHLOROTHIAZIDE 25; 25 MG/1; MG/1
1 TABLET TABLET ORAL ONCE A DAY
Status: ACTIVE | COMMUNITY

## 2022-05-02 RX ORDER — FOLIC ACID/VIT B COMPLEX AND C 0.8 MG
1 TABLET TABLET ORAL ONCE A DAY
Status: ACTIVE | COMMUNITY

## 2022-05-02 RX ORDER — DEXLANSOPRAZOLE 60 MG/1
TAKE 1 CAPSULE (60 MG) BY ORAL ROUTE ONCE DAILY CAPSULE, DELAYED RELEASE ORAL 1
Qty: 0 | Refills: 0 | Status: ACTIVE | COMMUNITY
Start: 1900-01-01

## 2022-05-02 RX ORDER — LINACLOTIDE 145 UG/1
TAKE ONE CAPSULE BY MOUTH EVERY DAY ON AN EMPTY STOMACH AT LEAST 30 MINUTES BEFORE FIRST MEAL OF THE DAY CAPSULE, GELATIN COATED ORAL
Qty: 90 | Refills: 1 | Status: ACTIVE | COMMUNITY
Start: 2018-10-15

## 2022-05-02 RX ORDER — ATENOLOL 25 MG/1
1 TABLET TABLET ORAL ONCE A DAY
Status: ACTIVE | COMMUNITY

## 2022-05-02 RX ORDER — AMLODIPINE BESYLATE 2.5 MG/1
TAKE 1 TABLET (2.5 MG) BY ORAL ROUTE ONCE DAILY TABLET ORAL 1
Qty: 0 | Refills: 0 | Status: ACTIVE | COMMUNITY
Start: 1900-01-01

## 2022-05-02 RX ORDER — ALBUTEROL SULFATE 90 UG/1
1 PUFF AS NEEDED AEROSOL, METERED RESPIRATORY (INHALATION)
Status: ACTIVE | COMMUNITY

## 2022-05-02 RX ORDER — ASPIRIN 81 MG/1
CHEW 1 TABLET (81 MG) BY ORAL ROUTE ONCE DAILY TABLET, CHEWABLE ORAL 1
Qty: 0 | Refills: 0 | Status: ACTIVE | COMMUNITY
Start: 1900-01-01

## 2022-05-02 RX ORDER — FAMOTIDINE 40 MG/1
1 TABLET AT BEDTIME TABLET, FILM COATED ORAL ONCE A DAY
Qty: 90 TABLET | Refills: 2 | OUTPATIENT
Start: 2022-05-02

## 2022-05-02 RX ORDER — ATORVASTATIN CALCIUM 20 MG/1
TAKE 1 TABLET (20 MG) BY ORAL ROUTE ONCE DAILY TABLET, FILM COATED ORAL 1
Qty: 0 | Refills: 0 | Status: ACTIVE | COMMUNITY
Start: 1900-01-01

## 2022-05-02 RX ORDER — ESTROGENS, CONJUGATED 0.62 MG/1
TAKE 1 TABLET (0.625 MG) BY ORAL ROUTE ONCE DAILY TABLET, FILM COATED ORAL 1
Qty: 0 | Refills: 0 | Status: ACTIVE | COMMUNITY
Start: 1900-01-01

## 2022-05-02 NOTE — HPI-TODAY'S VISIT:
May 2022 visit: Repeat EGD in April 2022 revealed no gross lesions in the esophagus, no findings to explain dysphagia, TTS esophageal balloon dilation performed to 18 mm, otherwise normal exam, path from proximal and distal esophagus revealed severe reflux type changes associated erosive esophagitis, gastric biopsies did not reveal any intestinal metaplasia or H. pylori infection.   Right now on Dexilant 60 mg, doesnt improve symptoms. upper abdominal aching. no dysphagia. even water causes epigastric burning. nausea even after smelling food. regular bowels. on Linzess. Labs in February 18, 2022 revealed hemoglobin 11.7, normal WBC, normal platelet count, normal bilirubin, alkaline phosphatase, AST, ALT, creatinine slightly elevated to 1.88, GFR is 33

## 2022-05-02 NOTE — HPI-OTHER HISTORIES
April 2022 visit:   A colonoscopy in August 2021 revealed 3 mm transverse colon polyp, 6 mm transverse colon polyp, repeat colonoscopy advised in 5 years, pathology was benign with no tubular adenoma.  EGD in April 2021 revealed patchy white plaques in the middle esophagus, somewhat tortuous appearing distal esophagus, TTS esophageal balloon dilation performed to 18 mm, gastric biopsy revealed intestinal metaplasia, no H. pylori infection seen, esophageal biopsies confirmed reflux esophagitis with no esophageal Candida.  A barium swallow prior to the EGD in April 2021 revealed narrowing at the GE junction with slight delay in passage of the barium tablet from the esophagus into the stomach with associated tertiary contraction waves of the mid and distal esophagus, no significant hiatal hernia or reflux seen.  on dexilant 60 mg. symptoms were better after previous egd in 2021. but since 2 months having worsening symptoms. continue to have heartburn. also describes epigastric burning. Pulmonologist dx with Eosinophilic asthma. mild episodic dysphagia.  Esophageal manometry was recommended in 2021 but not done by pt.  april 2021: burning even after drinking water. frequent regurgitation. on dexilant 60 mg. feels dysphagia upon any oral intake.   EGD in June 2020 revealed no gross lesions in the entire esophagus, dilated to 18 mm.  Path showed Candida esophagitis  seeing cardiologist dr fonseca for chf. cant recall name of meds. on diuretics. no nsaid use.  June 2020:  feels dysphagia, epigastric discomfort. chest spasms. not always with eating. also gets shortness of breath after eating. remains on dexilant. regular bowels. using linzess.  Oct 2019 - Dysphagia has improved with change in diet. Continues dexilant once daily. Linzess helps with constipation. No abdominal pain. Regular bowels. Stable weight. Labs  Sept 2019: CR 1. CT abdominal with contrast Sept 2019: No acute inflammatory process within the abdomen or pelvis. Atrophied right kidney for which she is scheduled to see a urologist.  EGD Sept 2019: - Esophagogastric landmarks identified. - No gross lesions in esophagus. Dilated 18 mm. - No gross lesions in esophagus. Biopsied. Path revealed lichenoid mucositis. - No gross lesions in the stomach. Biopsied. Path revealed PPI therapy effect. - Normal examined duodenum.   Sept 2019 - Four days ago acquired acute onset upper abdominal discomfort which lasted for 6 hours and then resolved. Eating foods causes discomfort. Brown stools. Remains on PPI. No recent NSAID use.   March 2019 - on dexilant for gerd. takes linzess 145 for constipation. no new symptoms. has tried to stop PPI but this causes severe gerd related symptoms.   NOV 2018 - EGD Oct 2018: normal. Gastric biopsy revealed intestinal metaplasia. Reflux esophagitis present. US abdomen: atrophic right kidney, gallbladder is normal, no stones. Patient remains on Dexilant despite which gets break through heartburn. She also reports episodic abdominal discomfort. She continues to remain on Linzess.   JULY 2018 - Patient was admitted to [facility] July 2018 with chest pain. She was near syncope. She underwent cardiac work-up which was negative. Labs: hgb 13, LFT's normal. JAN 2018 - Patient is presently on Dexilant 30 mg with relief in GERD. She is also using Linzess 145 mcg daily with good relief in constipation and no rectal bleeding at present time.  11/2017 - All three hemorrhoid plexus have been banded. Linzess 72 mcg was ineffective and therefore was increased to 145 mcg. Patient reports 1 episode of copious rectal bleeding. Improved bowel frequency with linzess use but occasionally no bowel movements despite taking medication. Heartburn has worsened but ran out of medications (Dexilant).   09/2017 - Previously banded left lateral and right posterior. Here for repeat banding. Linzess 72 mcg is not helping with constipation.   08/2017 - Patient reports intermittent heartburn. She is on Dexilant 30 mg once daily. She reports frequent abdominal bloating and has chronic constipation for which she uses Miralax 2-3 times a day. Patient is interested in hemorrhoid banding procedure.   03/2017 - The patient has been experiencing these symptoms for years. The heartburn lasts a variable amount of time. The patient has used medications to relieve the heartburn. Medications used include Dexilant with adequate relief. Current medications include Dexilant 60 mg oral capsule,biphase delayed releas. She has had extensive workup in the past which includes EGD in 2012, H Pylori infection s/p treatment, HBT - no SIBO, US Abdomen - normal. CT Abdomen without contrast - normal. Celiac panel - negative.   Esophageal manometry 2010 - also normal. Patient reports intolerance to Carfate in past. Adding Zantac also did not help with symptoms. Symptoms were refractory to Nexium 40 mg. However Dexilant 60 mg seems to be helping.   Nov 2015 EGD with 48 hr [facility] PH monitoring revealed - Mild chronic gastritis, No H Pylori. No esophagitis. But B[facility] Ph monitoring confirmed prescence of acid reflux. This was done when pt was off PPI therapy ( though pt was instructed to continue all meds during test she stopped PPI therapy 2 weeks prior to procedure) Constipation is wellcontrolled with Miralax once daily.   Colonoscopy March 2016 - poor prep Colonoscopy May 2016 - Internal hemorrhoids that prolapse with straining, but spontaneously regress to the resting position (Grade II) found on perianal exam. - Nonbleeding internal hemorrhoids. - No specimens collected Heartburn is well controlled with Dexilant 60 mg. She reports abdominal distension and bloating especially after meals.

## 2022-05-02 NOTE — PHYSICAL EXAM GASTROINTESTINAL
Abdomen , soft, mild upper tender, nondistended , no guarding or rigidity , no masses palpable , normal bowel sounds , Liver and Spleen , no hepatomegaly present , liver nontender , spleen not palpable

## 2022-07-11 ENCOUNTER — WEB ENCOUNTER (OUTPATIENT)
Dept: URBAN - METROPOLITAN AREA CLINIC 25 | Facility: CLINIC | Age: 77
End: 2022-07-11

## 2022-07-11 ENCOUNTER — OFFICE VISIT (OUTPATIENT)
Dept: URBAN - METROPOLITAN AREA CLINIC 25 | Facility: CLINIC | Age: 77
End: 2022-07-11
Payer: MEDICARE

## 2022-07-11 VITALS
TEMPERATURE: 97.3 F | BODY MASS INDEX: 33.15 KG/M2 | SYSTOLIC BLOOD PRESSURE: 120 MMHG | HEIGHT: 65 IN | DIASTOLIC BLOOD PRESSURE: 72 MMHG | HEART RATE: 64 BPM | WEIGHT: 199 LBS

## 2022-07-11 DIAGNOSIS — K21.00 GASTROESOPHAGEAL REFLUX DISEASE WITH ESOPHAGITIS WITHOUT HEMORRHAGE: ICD-10-CM

## 2022-07-11 DIAGNOSIS — R10.13 EPIGASTRIC ABDOMINAL PAIN: ICD-10-CM

## 2022-07-11 DIAGNOSIS — N18.9 CHRONIC KIDNEY DISEASE, UNSPECIFIED CKD STAGE: ICD-10-CM

## 2022-07-11 DIAGNOSIS — K59.04 CHRONIC IDIOPATHIC CONSTIPATION: ICD-10-CM

## 2022-07-11 DIAGNOSIS — Z86.010 PERSONAL HISTORY OF COLONIC POLYPS: ICD-10-CM

## 2022-07-11 PROCEDURE — 99213 OFFICE O/P EST LOW 20 MIN: CPT | Performed by: INTERNAL MEDICINE

## 2022-07-11 RX ORDER — ATENOLOL 25 MG/1
1 TABLET TABLET ORAL ONCE A DAY
Status: ACTIVE | COMMUNITY

## 2022-07-11 RX ORDER — ALBUTEROL SULFATE 90 UG/1
1 PUFF AS NEEDED AEROSOL, METERED RESPIRATORY (INHALATION)
Status: ACTIVE | COMMUNITY

## 2022-07-11 RX ORDER — DEXLANSOPRAZOLE 60 MG/1
TAKE 1 CAPSULE (60 MG) BY ORAL ROUTE ONCE DAILY CAPSULE, DELAYED RELEASE ORAL 1
Qty: 0 | Refills: 0 | Status: DISCONTINUED | COMMUNITY
Start: 1900-01-01

## 2022-07-11 RX ORDER — PANTOPRAZOLE SODIUM 40 MG/1
1 TABLET TABLET, DELAYED RELEASE ORAL ONCE A DAY
Qty: 90 | Refills: 2 | Status: ACTIVE | COMMUNITY
Start: 2022-05-02

## 2022-07-11 RX ORDER — AMLODIPINE BESYLATE 2.5 MG/1
TAKE 1 TABLET (2.5 MG) BY ORAL ROUTE ONCE DAILY TABLET ORAL 1
Qty: 0 | Refills: 0 | Status: ACTIVE | COMMUNITY
Start: 1900-01-01

## 2022-07-11 RX ORDER — PANTOPRAZOLE SODIUM 40 MG/1
1 TABLET TABLET, DELAYED RELEASE ORAL ONCE A DAY
Qty: 90 | Refills: 2 | OUTPATIENT

## 2022-07-11 RX ORDER — SUCRALFATE 1 G/10ML
10 ML ON AN EMPTY STOMACH SUSPENSION ORAL
Qty: 1200 ML | Refills: 2 | Status: DISCONTINUED | COMMUNITY
Start: 2022-04-21 | End: 2022-07-20

## 2022-07-11 RX ORDER — FAMOTIDINE 40 MG/1
1 TABLET AT BEDTIME TABLET, FILM COATED ORAL ONCE A DAY
Qty: 90 TABLET | Refills: 2 | Status: ACTIVE | COMMUNITY
Start: 2022-05-02

## 2022-07-11 RX ORDER — FOLIC ACID/VIT B COMPLEX AND C 0.8 MG
1 TABLET TABLET ORAL ONCE A DAY
Status: ACTIVE | COMMUNITY

## 2022-07-11 RX ORDER — ESTROGENS, CONJUGATED 0.62 MG/1
TAKE 1 TABLET (0.625 MG) BY ORAL ROUTE ONCE DAILY TABLET, FILM COATED ORAL 1
Qty: 0 | Refills: 0 | Status: ACTIVE | COMMUNITY
Start: 1900-01-01

## 2022-07-11 RX ORDER — FAMOTIDINE 40 MG/1
1 TABLET AT BEDTIME TABLET, FILM COATED ORAL ONCE A DAY
Qty: 90 TABLET | Refills: 2 | OUTPATIENT

## 2022-07-11 RX ORDER — ASPIRIN 81 MG/1
CHEW 1 TABLET (81 MG) BY ORAL ROUTE ONCE DAILY TABLET, CHEWABLE ORAL 1
Qty: 0 | Refills: 0 | Status: ACTIVE | COMMUNITY
Start: 1900-01-01

## 2022-07-11 RX ORDER — SPIRONOLACTONE AND HYDROCHLOROTHIAZIDE 25; 25 MG/1; MG/1
1 TABLET TABLET ORAL ONCE A DAY
Status: ACTIVE | COMMUNITY

## 2022-07-11 RX ORDER — LINACLOTIDE 145 UG/1
TAKE ONE CAPSULE BY MOUTH EVERY DAY ON AN EMPTY STOMACH AT LEAST 30 MINUTES BEFORE FIRST MEAL OF THE DAY CAPSULE, GELATIN COATED ORAL
Qty: 90 | Refills: 1 | Status: ACTIVE | COMMUNITY
Start: 2018-10-15

## 2022-07-11 RX ORDER — ATORVASTATIN CALCIUM 20 MG/1
TAKE 1 TABLET (20 MG) BY ORAL ROUTE ONCE DAILY TABLET, FILM COATED ORAL 1
Qty: 0 | Refills: 0 | Status: ACTIVE | COMMUNITY
Start: 1900-01-01

## 2022-07-11 NOTE — HPI-OTHER HISTORIES
May 2022 visit: Repeat EGD in April 2022 revealed no gross lesions in the esophagus, no findings to explain dysphagia, TTS esophageal balloon dilation performed to 18 mm, otherwise normal exam, path from proximal and distal esophagus revealed severe reflux type changes associated erosive esophagitis, gastric biopsies did not reveal any intestinal metaplasia or H. pylori infection.   Right now on Dexilant 60 mg, doesnt improve symptoms. upper abdominal aching. no dysphagia. even water causes epigastric burning. nausea even after smelling food. regular bowels. on Linzess. Labs in February 18, 2022 revealed hemoglobin 11.7, normal WBC, normal platelet count, normal bilirubin, alkaline phosphatase, AST, ALT, creatinine slightly elevated to 1.88, GFR is 33   April 2022 visit:   A colonoscopy in August 2021 revealed 3 mm transverse colon polyp, 6 mm transverse colon polyp, repeat colonoscopy advised in 5 years, pathology was benign with no tubular adenoma.  EGD in April 2021 revealed patchy white plaques in the middle esophagus, somewhat tortuous appearing distal esophagus, TTS esophageal balloon dilation performed to 18 mm, gastric biopsy revealed intestinal metaplasia, no H. pylori infection seen, esophageal biopsies confirmed reflux esophagitis with no esophageal Candida.  A barium swallow prior to the EGD in April 2021 revealed narrowing at the GE junction with slight delay in passage of the barium tablet from the esophagus into the stomach with associated tertiary contraction waves of the mid and distal esophagus, no significant hiatal hernia or reflux seen.  on dexilant 60 mg. symptoms were better after previous egd in 2021. but since 2 months having worsening symptoms. continue to have heartburn. also describes epigastric burning. Pulmonologist dx with Eosinophilic asthma. mild episodic dysphagia.  Esophageal manometry was recommended in 2021 but not done by pt.  april 2021: burning even after drinking water. frequent regurgitation. on dexilant 60 mg. feels dysphagia upon any oral intake.   EGD in June 2020 revealed no gross lesions in the entire esophagus, dilated to 18 mm.  Path showed Candida esophagitis  seeing cardiologist dr fonseca for chf. cant recall name of meds. on diuretics. no nsaid use.  June 2020:  feels dysphagia, epigastric discomfort. chest spasms. not always with eating. also gets shortness of breath after eating. remains on dexilant. regular bowels. using linzess.  Oct 2019 - Dysphagia has improved with change in diet. Continues dexilant once daily. Linzess helps with constipation. No abdominal pain. Regular bowels. Stable weight. Labs  Sept 2019: CR 1. CT abdominal with contrast Sept 2019: No acute inflammatory process within the abdomen or pelvis. Atrophied right kidney for which she is scheduled to see a urologist.  EGD Sept 2019: - Esophagogastric landmarks identified. - No gross lesions in esophagus. Dilated 18 mm. - No gross lesions in esophagus. Biopsied. Path revealed lichenoid mucositis. - No gross lesions in the stomach. Biopsied. Path revealed PPI therapy effect. - Normal examined duodenum.   Sept 2019 - Four days ago acquired acute onset upper abdominal discomfort which lasted for 6 hours and then resolved. Eating foods causes discomfort. Brown stools. Remains on PPI. No recent NSAID use.   March 2019 - on dexilant for gerd. takes linzess 145 for constipation. no new symptoms. has tried to stop PPI but this causes severe gerd related symptoms.   NOV 2018 - EGD Oct 2018: normal. Gastric biopsy revealed intestinal metaplasia. Reflux esophagitis present. US abdomen: atrophic right kidney, gallbladder is normal, no stones. Patient remains on Dexilant despite which gets break through heartburn. She also reports episodic abdominal discomfort. She continues to remain on Linzess.   JULY 2018 - Patient was admitted to [facility] [place] July 2018 with chest pain. She was near syncope. She underwent cardiac work-up which was negative. Labs: hgb 13, LFT's normal. JAN 2018 - Patient is presently on Dexilant 30 mg with relief in GERD. She is also using Linzess 145 mcg daily with good relief in constipation and no rectal bleeding at present time.  11/2017 - All three hemorrhoid plexus have been banded. Linzess 72 mcg was ineffective and therefore was increased to 145 mcg. Patient reports 1 episode of copious rectal bleeding. Improved bowel frequency with linzess use but occasionally no bowel movements despite taking medication. Heartburn has worsened but ran out of medications (Dexilant).   09/2017 - Previously banded left lateral and right posterior. Here for repeat banding. Linzess 72 mcg is not helping with constipation.   08/2017 - Patient reports intermittent heartburn. She is on Dexilant 30 mg once daily. She reports frequent abdominal bloating and has chronic constipation for which she uses Miralax 2-3 times a day. Patient is interested in hemorrhoid banding procedure.   03/2017 - The patient has been experiencing these symptoms for years. The heartburn lasts a variable amount of time. The patient has used medications to relieve the heartburn. Medications used include Dexilant with adequate relief. Current medications include Dexilant 60 mg oral capsule,biphase delayed releas. She has had extensive workup in the past which includes EGD in 2012, H Pylori infection s/p treatment, HBT - no SIBO, US Abdomen - normal. CT Abdomen without contrast - normal. Celiac panel - negative.   Esophageal manometry 2010 - also normal. Patient reports intolerance to Carfate in past. Adding Zantac also did not help with symptoms. Symptoms were refractory to Nexium 40 mg. However Dexilant 60 mg seems to be helping.   Nov 2015 EGD with 48 hr Bravo PH monitoring revealed - Mild chronic gastritis, No H Pylori. No esophagitis. But Bravo Ph monitoring confirmed prescence of acid reflux. This was done when pt was off PPI therapy ( though pt was instructed to continue all meds during test she stopped PPI therapy 2 weeks prior to procedure) Constipation is wellcontrolled with Miralax once daily.   Colonoscopy March 2016 - poor prep Colonoscopy May 2016 - Internal hemorrhoids that prolapse with straining, but spontaneously regress to the resting position (Grade II) found on perianal exam. - Nonbleeding internal hemorrhoids. - No specimens collected Heartburn is well controlled with Dexilant 60 mg. She reports abdominal distension and bloating especially after meals.
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
## 2023-01-09 ENCOUNTER — OFFICE VISIT (OUTPATIENT)
Dept: URBAN - METROPOLITAN AREA CLINIC 25 | Facility: CLINIC | Age: 78
End: 2023-01-09
Payer: MEDICARE

## 2023-01-09 VITALS
TEMPERATURE: 97.5 F | HEIGHT: 65 IN | BODY MASS INDEX: 30.42 KG/M2 | WEIGHT: 182.6 LBS | DIASTOLIC BLOOD PRESSURE: 76 MMHG | SYSTOLIC BLOOD PRESSURE: 113 MMHG | HEART RATE: 73 BPM

## 2023-01-09 DIAGNOSIS — K21.00 GASTROESOPHAGEAL REFLUX DISEASE WITH ESOPHAGITIS WITHOUT HEMORRHAGE: ICD-10-CM

## 2023-01-09 DIAGNOSIS — K59.04 CHRONIC IDIOPATHIC CONSTIPATION: ICD-10-CM

## 2023-01-09 DIAGNOSIS — Z86.010 PERSONAL HISTORY OF COLONIC POLYPS: ICD-10-CM

## 2023-01-09 DIAGNOSIS — N18.9 CHRONIC KIDNEY DISEASE, UNSPECIFIED CKD STAGE: ICD-10-CM

## 2023-01-09 PROBLEM — 82934008 CHRONIC IDIOPATHIC CONSTIPATION: Status: ACTIVE | Noted: 2022-04-04

## 2023-01-09 PROBLEM — 428283002 HISTORY OF POLYP OF COLON (SITUATION): Status: ACTIVE | Noted: 2022-04-04

## 2023-01-09 PROBLEM — 709044004: Status: ACTIVE | Noted: 2022-05-02

## 2023-01-09 PROCEDURE — 99213 OFFICE O/P EST LOW 20 MIN: CPT | Performed by: INTERNAL MEDICINE

## 2023-01-09 RX ORDER — AMLODIPINE BESYLATE 2.5 MG/1
TAKE 1 TABLET (2.5 MG) BY ORAL ROUTE ONCE DAILY TABLET ORAL 1
Qty: 0 | Refills: 0 | Status: ACTIVE | COMMUNITY
Start: 1900-01-01

## 2023-01-09 RX ORDER — FAMOTIDINE 40 MG/1
1 TABLET AT BEDTIME TABLET, FILM COATED ORAL ONCE A DAY
Qty: 90 TABLET | Refills: 2 | Status: ACTIVE | COMMUNITY

## 2023-01-09 RX ORDER — PANTOPRAZOLE SODIUM 40 MG/1
1 TABLET TABLET, DELAYED RELEASE ORAL ONCE A DAY
Qty: 90 | Refills: 2 | OUTPATIENT

## 2023-01-09 RX ORDER — ALBUTEROL SULFATE 90 UG/1
1 PUFF AS NEEDED AEROSOL, METERED RESPIRATORY (INHALATION)
Status: ACTIVE | COMMUNITY

## 2023-01-09 RX ORDER — ATENOLOL 25 MG/1
1 TABLET TABLET ORAL ONCE A DAY
Status: ACTIVE | COMMUNITY

## 2023-01-09 RX ORDER — ESTROGENS, CONJUGATED 0.62 MG/1
TAKE 1 TABLET (0.625 MG) BY ORAL ROUTE ONCE DAILY TABLET, FILM COATED ORAL 1
Qty: 0 | Refills: 0 | Status: ACTIVE | COMMUNITY
Start: 1900-01-01

## 2023-01-09 RX ORDER — SPIRONOLACTONE AND HYDROCHLOROTHIAZIDE 25; 25 MG/1; MG/1
1 TABLET TABLET ORAL ONCE A DAY
Status: ACTIVE | COMMUNITY

## 2023-01-09 RX ORDER — ASPIRIN 81 MG/1
CHEW 1 TABLET (81 MG) BY ORAL ROUTE ONCE DAILY TABLET, CHEWABLE ORAL 1
Qty: 0 | Refills: 0 | Status: ACTIVE | COMMUNITY
Start: 1900-01-01

## 2023-01-09 RX ORDER — LINACLOTIDE 145 UG/1
TAKE ONE CAPSULE BY MOUTH EVERY DAY ON AN EMPTY STOMACH AT LEAST 30 MINUTES BEFORE FIRST MEAL OF THE DAY CAPSULE, GELATIN COATED ORAL
Qty: 90 | Refills: 1 | Status: ACTIVE | COMMUNITY
Start: 2018-10-15

## 2023-01-09 RX ORDER — FAMOTIDINE 40 MG/1
1 TABLET AT BEDTIME TABLET, FILM COATED ORAL ONCE A DAY
Qty: 90 TABLET | Refills: 2 | OUTPATIENT

## 2023-01-09 RX ORDER — PANTOPRAZOLE SODIUM 40 MG/1
1 TABLET TABLET, DELAYED RELEASE ORAL ONCE A DAY
Qty: 90 | Refills: 2 | Status: ACTIVE | COMMUNITY

## 2023-01-09 RX ORDER — ATORVASTATIN CALCIUM 20 MG/1
TAKE 1 TABLET (20 MG) BY ORAL ROUTE ONCE DAILY TABLET, FILM COATED ORAL 1
Qty: 0 | Refills: 0 | Status: ACTIVE | COMMUNITY
Start: 1900-01-01

## 2023-01-09 RX ORDER — FOLIC ACID/VIT B COMPLEX AND C 0.8 MG
1 TABLET TABLET ORAL ONCE A DAY
Status: ACTIVE | COMMUNITY
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
## 2023-01-09 NOTE — HPI-TODAY'S VISIT:
Jan 2023 visit:  EGD in April 22 revealed normal esophagus, endoscopically normal-appearing esophagus for dysphagia evaluation, esophagus dilated to 18 mm, otherwise normal-appearing stomach and duodenum, biopsy from distal and proximal esophagus revealed erosive esophagitis.  Gastric biopsy did not reveal any intestinal metaplasia or H. pylori  c spine surgery in sept 2022. done with PT. on linzess for constipation. on protonix 40 mg + pepcid at bedtime. continues to have burning in chest / regurgitation.

## 2023-03-02 NOTE — PHYSICAL EXAM HENT:
Head, normocephalic, atraumatic, Face, Face within normal limits, Ears, External ears within normal limits, Nose/Nasopharynx, External nose normal appearance, nares patent, no nasal discharge, Mouth and Throat, Oral cavity appearance normal, Lips, Appearance normal
English

## 2023-04-21 ENCOUNTER — TELEPHONE ENCOUNTER (OUTPATIENT)
Dept: URBAN - METROPOLITAN AREA CLINIC 25 | Facility: CLINIC | Age: 78
End: 2023-04-21

## 2023-04-21 RX ORDER — LINACLOTIDE 145 UG/1
1 CAPSULE AT LEAST 30 MINUTES BEFORE THE FIRST MEAL OF THE DAY ON AN EMPTY STOMACH CAPSULE, GELATIN COATED ORAL ONCE A DAY
Qty: 90 | Refills: 5
Start: 2018-10-15 | End: 2024-10-12

## 2023-06-12 ENCOUNTER — DASHBOARD ENCOUNTERS (OUTPATIENT)
Age: 78
End: 2023-06-12

## 2023-06-12 ENCOUNTER — OFFICE VISIT (OUTPATIENT)
Dept: URBAN - METROPOLITAN AREA CLINIC 25 | Facility: CLINIC | Age: 78
End: 2023-06-12
Payer: MEDICARE

## 2023-06-12 VITALS
HEART RATE: 60 BPM | SYSTOLIC BLOOD PRESSURE: 115 MMHG | TEMPERATURE: 97.5 F | WEIGHT: 181.8 LBS | HEIGHT: 65 IN | DIASTOLIC BLOOD PRESSURE: 76 MMHG | BODY MASS INDEX: 30.29 KG/M2

## 2023-06-12 DIAGNOSIS — K59.04 CHRONIC IDIOPATHIC CONSTIPATION: ICD-10-CM

## 2023-06-12 DIAGNOSIS — N18.9 CHRONIC KIDNEY DISEASE, UNSPECIFIED CKD STAGE: ICD-10-CM

## 2023-06-12 DIAGNOSIS — Z86.010 PERSONAL HISTORY OF COLONIC POLYPS: ICD-10-CM

## 2023-06-12 DIAGNOSIS — K21.00 GASTROESOPHAGEAL REFLUX DISEASE WITH ESOPHAGITIS WITHOUT HEMORRHAGE: ICD-10-CM

## 2023-06-12 PROCEDURE — 99213 OFFICE O/P EST LOW 20 MIN: CPT | Performed by: INTERNAL MEDICINE

## 2023-06-12 RX ORDER — SPIRONOLACTONE AND HYDROCHLOROTHIAZIDE 25; 25 MG/1; MG/1
1 TABLET TABLET ORAL ONCE A DAY
Status: DISCONTINUED | COMMUNITY

## 2023-06-12 RX ORDER — CALCITRIOL 0.25 UG/1
1 CAPSULE CAPSULE ORAL
Status: ACTIVE | COMMUNITY

## 2023-06-12 RX ORDER — ASPIRIN 81 MG/1
CHEW 1 TABLET (81 MG) BY ORAL ROUTE ONCE DAILY TABLET, CHEWABLE ORAL 1
Qty: 0 | Refills: 0 | Status: ACTIVE | COMMUNITY
Start: 1900-01-01

## 2023-06-12 RX ORDER — FOLIC ACID/VIT B COMPLEX AND C 0.8 MG
1 TABLET TABLET ORAL ONCE A DAY
Status: ACTIVE | COMMUNITY

## 2023-06-12 RX ORDER — FUROSEMIDE 40 MG/1
1 TABLET TABLET ORAL ONCE A DAY
Status: ACTIVE | COMMUNITY

## 2023-06-12 RX ORDER — PANTOPRAZOLE SODIUM 40 MG/1
1 TABLET TABLET, DELAYED RELEASE ORAL ONCE A DAY
Qty: 90 | Refills: 2 | Status: ACTIVE | COMMUNITY

## 2023-06-12 RX ORDER — ATENOLOL 25 MG/1
1 TABLET TABLET ORAL ONCE A DAY
Status: ACTIVE | COMMUNITY

## 2023-06-12 RX ORDER — FAMOTIDINE 40 MG/1
1 TABLET AT BEDTIME TABLET, FILM COATED ORAL ONCE A DAY
Qty: 90 TABLET | Refills: 2 | Status: DISCONTINUED | COMMUNITY

## 2023-06-12 RX ORDER — LINACLOTIDE 145 UG/1
1 CAPSULE AT LEAST 30 MINUTES BEFORE THE FIRST MEAL OF THE DAY ON AN EMPTY STOMACH CAPSULE, GELATIN COATED ORAL ONCE A DAY
Qty: 90 | Refills: 5 | Status: ACTIVE | COMMUNITY
Start: 2018-10-15 | End: 2024-10-12

## 2023-06-12 RX ORDER — ATORVASTATIN CALCIUM 20 MG/1
TAKE 1 TABLET (20 MG) BY ORAL ROUTE ONCE DAILY TABLET, FILM COATED ORAL 1
Qty: 0 | Refills: 0 | Status: ACTIVE | COMMUNITY
Start: 1900-01-01

## 2023-06-12 RX ORDER — ALBUTEROL SULFATE 90 UG/1
1 PUFF AS NEEDED AEROSOL, METERED RESPIRATORY (INHALATION)
Status: ACTIVE | COMMUNITY

## 2023-06-12 RX ORDER — ESTROGENS, CONJUGATED 0.62 MG/1
TAKE 1 TABLET (0.625 MG) BY ORAL ROUTE ONCE DAILY TABLET, FILM COATED ORAL 1
Qty: 0 | Refills: 0 | Status: DISCONTINUED | COMMUNITY
Start: 1900-01-01

## 2023-06-12 RX ORDER — AMLODIPINE BESYLATE 2.5 MG/1
TAKE 1 TABLET (2.5 MG) BY ORAL ROUTE ONCE DAILY TABLET ORAL 1
Qty: 0 | Refills: 0 | Status: ACTIVE | COMMUNITY
Start: 1900-01-01
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
## 2023-06-12 NOTE — HPI-TODAY'S VISIT:
June 2023: gerd symptoms wellcontrolled on protonix 40 mg with occasional breakthrough symptoms. 2 bad days per month. no dysphagia. Linzess working well for constipation.

## 2023-12-12 ENCOUNTER — TELEPHONE ENCOUNTER (OUTPATIENT)
Dept: URBAN - METROPOLITAN AREA CLINIC 25 | Facility: CLINIC | Age: 78
End: 2023-12-12

## 2023-12-12 RX ORDER — PANTOPRAZOLE SODIUM 40 MG/1
1 TABLET TABLET, DELAYED RELEASE ORAL ONCE A DAY
Qty: 90 | Refills: 5

## 2024-06-10 ENCOUNTER — OFFICE VISIT (OUTPATIENT)
Dept: URBAN - METROPOLITAN AREA CLINIC 25 | Facility: CLINIC | Age: 79
End: 2024-06-10
Payer: COMMERCIAL

## 2024-06-10 ENCOUNTER — LAB OUTSIDE AN ENCOUNTER (OUTPATIENT)
Dept: URBAN - METROPOLITAN AREA CLINIC 25 | Facility: CLINIC | Age: 79
End: 2024-06-10

## 2024-06-10 VITALS
BODY MASS INDEX: 29.32 KG/M2 | HEIGHT: 65 IN | HEART RATE: 71 BPM | SYSTOLIC BLOOD PRESSURE: 111 MMHG | WEIGHT: 176 LBS | DIASTOLIC BLOOD PRESSURE: 72 MMHG | TEMPERATURE: 97.5 F

## 2024-06-10 DIAGNOSIS — Z86.010 PERSONAL HISTORY OF COLONIC POLYPS: ICD-10-CM

## 2024-06-10 DIAGNOSIS — K21.00 GASTROESOPHAGEAL REFLUX DISEASE WITH ESOPHAGITIS WITHOUT HEMORRHAGE: ICD-10-CM

## 2024-06-10 DIAGNOSIS — K59.04 CHRONIC IDIOPATHIC CONSTIPATION: ICD-10-CM

## 2024-06-10 DIAGNOSIS — N18.9 CHRONIC KIDNEY DISEASE, UNSPECIFIED CKD STAGE: ICD-10-CM

## 2024-06-10 DIAGNOSIS — R10.11 RIGHT UPPER QUADRANT ABDOMINAL PAIN: ICD-10-CM

## 2024-06-10 PROCEDURE — 99214 OFFICE O/P EST MOD 30 MIN: CPT | Performed by: INTERNAL MEDICINE

## 2024-06-10 RX ORDER — ATORVASTATIN CALCIUM 20 MG/1
TAKE 1 TABLET (20 MG) BY ORAL ROUTE ONCE DAILY TABLET, FILM COATED ORAL 1
Qty: 0 | Refills: 0 | COMMUNITY
Start: 1900-01-01

## 2024-06-10 RX ORDER — FUROSEMIDE 40 MG/1
1 TABLET TABLET ORAL ONCE A DAY
COMMUNITY

## 2024-06-10 RX ORDER — LINACLOTIDE 145 UG/1
1 CAPSULE AT LEAST 30 MINUTES BEFORE THE FIRST MEAL OF THE DAY ON AN EMPTY STOMACH CAPSULE, GELATIN COATED ORAL ONCE A DAY
Qty: 90 | Refills: 5 | COMMUNITY
Start: 2018-10-15 | End: 2024-10-12

## 2024-06-10 RX ORDER — PANTOPRAZOLE SODIUM 40 MG/1
1 TABLET TABLET, DELAYED RELEASE ORAL ONCE A DAY
Qty: 90 | Refills: 5 | COMMUNITY

## 2024-06-10 RX ORDER — ASPIRIN 81 MG/1
CHEW 1 TABLET (81 MG) BY ORAL ROUTE ONCE DAILY TABLET, CHEWABLE ORAL 1
Qty: 0 | Refills: 0 | COMMUNITY
Start: 1900-01-01

## 2024-06-10 RX ORDER — ATENOLOL 25 MG/1
1 TABLET TABLET ORAL ONCE A DAY
COMMUNITY

## 2024-06-10 RX ORDER — FOLIC ACID/VIT B COMPLEX AND C 0.8 MG
1 TABLET TABLET ORAL ONCE A DAY
COMMUNITY

## 2024-06-10 RX ORDER — CALCITRIOL 0.25 UG/1
1 CAPSULE CAPSULE ORAL
COMMUNITY

## 2024-06-10 RX ORDER — ALBUTEROL SULFATE 90 UG/1
1 PUFF AS NEEDED AEROSOL, METERED RESPIRATORY (INHALATION)
COMMUNITY

## 2024-06-10 RX ORDER — LINACLOTIDE 290 UG/1
1 CAPSULE AT LEAST 30 MINUTES BEFORE THE FIRST MEAL OF THE DAY ON AN EMPTY STOMACH CAPSULE, GELATIN COATED ORAL ONCE A DAY
Qty: 90 | Refills: 2 | OUTPATIENT
Start: 2024-06-10 | End: 2025-03-06

## 2024-06-10 RX ORDER — AMLODIPINE BESYLATE 2.5 MG/1
TAKE 1 TABLET (2.5 MG) BY ORAL ROUTE ONCE DAILY TABLET ORAL 1
Qty: 0 | Refills: 0 | COMMUNITY
Start: 1900-01-01

## 2024-06-10 RX ORDER — FAMOTIDINE 40 MG/1
1 TABLET AT BEDTIME TABLET, FILM COATED ORAL ONCE A DAY
Qty: 90 TABLET | Refills: 5 | COMMUNITY
Start: 2024-02-29

## 2024-06-10 NOTE — HPI-TODAY'S VISIT:
June 2024: using linzess 145 but having BM every 2-3 days and hard stools. using PPI in am and H2B in pm. no gerd symptoms. she still needs TUMS prn. rt upper abdominal pain X 3-4 months.
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
## 2024-06-27 ENCOUNTER — LAB OUTSIDE AN ENCOUNTER (OUTPATIENT)
Dept: URBAN - METROPOLITAN AREA CLINIC 25 | Facility: CLINIC | Age: 79
End: 2024-06-27

## 2024-06-28 ENCOUNTER — TELEPHONE ENCOUNTER (OUTPATIENT)
Dept: URBAN - METROPOLITAN AREA CLINIC 25 | Facility: CLINIC | Age: 79
End: 2024-06-28

## 2024-12-09 ENCOUNTER — LAB OUTSIDE AN ENCOUNTER (OUTPATIENT)
Dept: URBAN - METROPOLITAN AREA CLINIC 25 | Facility: CLINIC | Age: 79
End: 2024-12-09

## 2024-12-09 ENCOUNTER — OFFICE VISIT (OUTPATIENT)
Dept: URBAN - METROPOLITAN AREA CLINIC 25 | Facility: CLINIC | Age: 79
End: 2024-12-09
Payer: COMMERCIAL

## 2024-12-09 ENCOUNTER — TELEPHONE ENCOUNTER (OUTPATIENT)
Dept: URBAN - METROPOLITAN AREA CLINIC 25 | Facility: CLINIC | Age: 79
End: 2024-12-09

## 2024-12-09 VITALS
WEIGHT: 177.2 LBS | HEART RATE: 71 BPM | TEMPERATURE: 97 F | SYSTOLIC BLOOD PRESSURE: 101 MMHG | DIASTOLIC BLOOD PRESSURE: 67 MMHG | BODY MASS INDEX: 29.52 KG/M2 | HEIGHT: 65 IN

## 2024-12-09 DIAGNOSIS — Z86.0100 HISTORY OF COLON POLYPS: ICD-10-CM

## 2024-12-09 DIAGNOSIS — K59.04 CHRONIC IDIOPATHIC CONSTIPATION: ICD-10-CM

## 2024-12-09 DIAGNOSIS — N18.9 CHRONIC KIDNEY DISEASE, UNSPECIFIED CKD STAGE: ICD-10-CM

## 2024-12-09 DIAGNOSIS — K21.00 GASTROESOPHAGEAL REFLUX DISEASE WITH ESOPHAGITIS WITHOUT HEMORRHAGE: ICD-10-CM

## 2024-12-09 DIAGNOSIS — R13.19 ESOPHAGEAL DYSPHAGIA: ICD-10-CM

## 2024-12-09 PROCEDURE — 99214 OFFICE O/P EST MOD 30 MIN: CPT | Performed by: INTERNAL MEDICINE

## 2024-12-09 RX ORDER — ALBUTEROL SULFATE 90 UG/1
1 PUFF AS NEEDED AEROSOL, METERED RESPIRATORY (INHALATION)
Status: ACTIVE | COMMUNITY

## 2024-12-09 RX ORDER — FAMOTIDINE 40 MG/1
1 TABLET AT BEDTIME TABLET, FILM COATED ORAL ONCE A DAY
Qty: 90 TABLET | Refills: 5 | COMMUNITY
Start: 2024-02-29

## 2024-12-09 RX ORDER — FOLIC ACID/VIT B COMPLEX AND C 0.8 MG
1 TABLET TABLET ORAL ONCE A DAY
Status: ACTIVE | COMMUNITY

## 2024-12-09 RX ORDER — CALCITRIOL 0.25 UG/1
1 CAPSULE CAPSULE ORAL
COMMUNITY

## 2024-12-09 RX ORDER — ATENOLOL 25 MG/1
1 TABLET TABLET ORAL ONCE A DAY
Status: ACTIVE | COMMUNITY

## 2024-12-09 RX ORDER — FUROSEMIDE 40 MG/1
1 TABLET TABLET ORAL ONCE A DAY
Status: ACTIVE | COMMUNITY

## 2024-12-09 RX ORDER — PANTOPRAZOLE SODIUM 40 MG/1
1 TABLET TABLET, DELAYED RELEASE ORAL ONCE A DAY
Qty: 90 | Refills: 5 | Status: ACTIVE | COMMUNITY

## 2024-12-09 RX ORDER — ASPIRIN 81 MG/1
CHEW 1 TABLET (81 MG) BY ORAL ROUTE ONCE DAILY TABLET, CHEWABLE ORAL 1
Qty: 0 | Refills: 0 | Status: ACTIVE | COMMUNITY
Start: 1900-01-01

## 2024-12-09 RX ORDER — ATORVASTATIN CALCIUM 20 MG/1
TAKE 1 TABLET (20 MG) BY ORAL ROUTE ONCE DAILY TABLET, FILM COATED ORAL 1
Qty: 0 | Refills: 0 | Status: ACTIVE | COMMUNITY
Start: 1900-01-01

## 2024-12-09 RX ORDER — LINACLOTIDE 290 UG/1
1 CAPSULE AT LEAST 30 MINUTES BEFORE THE FIRST MEAL OF THE DAY ON AN EMPTY STOMACH CAPSULE, GELATIN COATED ORAL ONCE A DAY
Qty: 90 | Refills: 2 | Status: ACTIVE | COMMUNITY
Start: 2024-06-10 | End: 2025-03-06

## 2024-12-09 NOTE — HPI-TODAY'S VISIT:
Dec 2024: June 2024 US normal. no gall stones. taking daily PPI. also using linzess 290 daily. she had a cspine surgery in oct 2022.  she gets periodic dysphagia to solids and liquids. continues to fees heartburn despite being on PPI.
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
## 2024-12-26 ENCOUNTER — OFFICE VISIT (OUTPATIENT)
Dept: URBAN - METROPOLITAN AREA SURGERY CENTER 20 | Facility: SURGERY CENTER | Age: 79
End: 2024-12-26

## 2024-12-30 ENCOUNTER — OFFICE VISIT (OUTPATIENT)
Dept: URBAN - METROPOLITAN AREA SURGERY CENTER 16 | Facility: SURGERY CENTER | Age: 79
End: 2024-12-30

## 2024-12-30 RX ORDER — PANTOPRAZOLE SODIUM 40 MG/1
1 TABLET TABLET, DELAYED RELEASE ORAL ONCE A DAY
Qty: 90 | Refills: 5 | Status: ACTIVE | COMMUNITY

## 2024-12-30 RX ORDER — LINACLOTIDE 290 UG/1
1 CAPSULE AT LEAST 30 MINUTES BEFORE THE FIRST MEAL OF THE DAY ON AN EMPTY STOMACH CAPSULE, GELATIN COATED ORAL ONCE A DAY
Qty: 90 | Refills: 2 | Status: ACTIVE | COMMUNITY
Start: 2024-06-10 | End: 2025-03-06

## 2024-12-30 RX ORDER — ASPIRIN 81 MG/1
CHEW 1 TABLET (81 MG) BY ORAL ROUTE ONCE DAILY TABLET, CHEWABLE ORAL 1
Qty: 0 | Refills: 0 | Status: ACTIVE | COMMUNITY
Start: 1900-01-01

## 2024-12-30 RX ORDER — ATENOLOL 25 MG/1
1 TABLET TABLET ORAL ONCE A DAY
Status: ACTIVE | COMMUNITY

## 2024-12-30 RX ORDER — FOLIC ACID/VIT B COMPLEX AND C 0.8 MG
1 TABLET TABLET ORAL ONCE A DAY
Status: ACTIVE | COMMUNITY

## 2024-12-30 RX ORDER — ESOMEPRAZOLE MAGNESIUM 40 MG/1
1 CAPSULE CAPSULE, DELAYED RELEASE PELLETS ORAL ONCE A DAY
Qty: 90 CAPSULE | Refills: 3 | OUTPATIENT
Start: 2024-12-30

## 2024-12-30 RX ORDER — CALCITRIOL 0.25 UG/1
1 CAPSULE CAPSULE ORAL
COMMUNITY

## 2024-12-30 RX ORDER — ATORVASTATIN CALCIUM 20 MG/1
TAKE 1 TABLET (20 MG) BY ORAL ROUTE ONCE DAILY TABLET, FILM COATED ORAL 1
Qty: 0 | Refills: 0 | Status: ACTIVE | COMMUNITY
Start: 1900-01-01

## 2024-12-30 RX ORDER — ALBUTEROL SULFATE 90 UG/1
1 PUFF AS NEEDED AEROSOL, METERED RESPIRATORY (INHALATION)
Status: ACTIVE | COMMUNITY

## 2024-12-30 RX ORDER — FUROSEMIDE 40 MG/1
1 TABLET TABLET ORAL ONCE A DAY
Status: ACTIVE | COMMUNITY

## 2024-12-30 RX ORDER — FAMOTIDINE 40 MG/1
1 TABLET AT BEDTIME TABLET, FILM COATED ORAL ONCE A DAY
Qty: 90 TABLET | Refills: 5 | COMMUNITY
Start: 2024-02-29

## 2025-01-02 ENCOUNTER — TELEPHONE ENCOUNTER (OUTPATIENT)
Dept: URBAN - METROPOLITAN AREA CLINIC 5 | Facility: CLINIC | Age: 80
End: 2025-01-02

## 2025-01-07 ENCOUNTER — TELEPHONE ENCOUNTER (OUTPATIENT)
Dept: URBAN - METROPOLITAN AREA CLINIC 25 | Facility: CLINIC | Age: 80
End: 2025-01-07

## 2025-01-07 RX ORDER — FLUCONAZOLE 200 MG/1
1 TABLET TABLET ORAL ONCE A DAY
Qty: 14 TABLET | Refills: 0 | OUTPATIENT
Start: 2025-01-07 | End: 2025-01-21

## 2025-01-17 ENCOUNTER — TELEPHONE ENCOUNTER (OUTPATIENT)
Dept: URBAN - METROPOLITAN AREA CLINIC 25 | Facility: CLINIC | Age: 80
End: 2025-01-17

## 2025-05-27 ENCOUNTER — LAB OUTSIDE AN ENCOUNTER (OUTPATIENT)
Dept: URBAN - METROPOLITAN AREA CLINIC 84 | Facility: CLINIC | Age: 80
End: 2025-05-27

## 2025-05-27 ENCOUNTER — ERX REFILL RESPONSE (OUTPATIENT)
Dept: URBAN - METROPOLITAN AREA CLINIC 25 | Facility: CLINIC | Age: 80
End: 2025-05-27

## 2025-05-27 ENCOUNTER — OFFICE VISIT (OUTPATIENT)
Dept: URBAN - METROPOLITAN AREA CLINIC 84 | Facility: CLINIC | Age: 80
End: 2025-05-27
Payer: COMMERCIAL

## 2025-05-27 DIAGNOSIS — K59.04 CHRONIC IDIOPATHIC CONSTIPATION: ICD-10-CM

## 2025-05-27 DIAGNOSIS — Z87.01 HISTORY OF ASPIRATION PNEUMONIA: ICD-10-CM

## 2025-05-27 DIAGNOSIS — K21.00 GASTROESOPHAGEAL REFLUX DISEASE WITH ESOPHAGITIS WITHOUT HEMORRHAGE: ICD-10-CM

## 2025-05-27 DIAGNOSIS — Z86.0100 HISTORY OF COLON POLYPS: ICD-10-CM

## 2025-05-27 DIAGNOSIS — R13.19 ESOPHAGEAL DYSPHAGIA: ICD-10-CM

## 2025-05-27 DIAGNOSIS — N18.9 CHRONIC KIDNEY DISEASE, UNSPECIFIED CKD STAGE: ICD-10-CM

## 2025-05-27 PROCEDURE — 99214 OFFICE O/P EST MOD 30 MIN: CPT | Performed by: INTERNAL MEDICINE

## 2025-05-27 RX ORDER — FAMOTIDINE 40 MG/1
TAKE 1 TABLET BY MOUTH DAILY AT BEDTIME AS NEEDED TABLET, FILM COATED ORAL
Qty: 90 EACH | Refills: 1 | Status: ACTIVE | COMMUNITY

## 2025-05-27 RX ORDER — PANTOPRAZOLE SODIUM 40 MG/1
1 TABLET TABLET, DELAYED RELEASE ORAL ONCE A DAY
Qty: 90 | Refills: 5 | OUTPATIENT

## 2025-05-27 RX ORDER — ALBUTEROL SULFATE 90 UG/1
1 PUFF AS NEEDED AEROSOL, METERED RESPIRATORY (INHALATION)
Status: ON HOLD | COMMUNITY

## 2025-05-27 RX ORDER — CALCITRIOL 0.25 UG/1
CAPSULE, LIQUID FILLED ORAL
Qty: 90 EACH | Refills: 0 | Status: ACTIVE | COMMUNITY

## 2025-05-27 RX ORDER — VIT B COMP NO.3/FOLIC/C/BIOTIN 1 MG-60 MG
TAKE 1 TABLET BY MOUTH EVERY DAY TABLET ORAL
Qty: 90 EACH | Refills: 2 | Status: ACTIVE | COMMUNITY

## 2025-05-27 RX ORDER — ESOMEPRAZOLE MAGNESIUM 40 MG/1
1 CAPSULE CAPSULE, DELAYED RELEASE PELLETS ORAL ONCE A DAY
Qty: 90 CAPSULE | Refills: 3 | Status: ON HOLD | COMMUNITY
Start: 2024-12-30

## 2025-05-27 RX ORDER — LIDOCAINE HYDROCHLORIDE 20 MG/ML
SOLUTION ORAL
Qty: 20 MILLILITER | Status: ACTIVE | COMMUNITY

## 2025-05-27 RX ORDER — PROMETHAZINE HYDROCHLORIDE AND DEXTROMETHORPHAN HYDROBROMIDE 6.25; 15 MG/5ML; MG/5ML
TAKE 5 ML BY MOUTH EVERY 4 TO 6 HOURS FOR 7 DAYS AS NEEDED FOR COUGH SOLUTION ORAL
Qty: 118 MILLILITER | Refills: 0 | Status: ACTIVE | COMMUNITY

## 2025-05-27 RX ORDER — ASPIRIN 81 MG/1
CHEW 1 TABLET (81 MG) BY ORAL ROUTE ONCE DAILY TABLET, CHEWABLE ORAL 1
Qty: 0 | Refills: 0 | Status: ON HOLD | COMMUNITY
Start: 1900-01-01

## 2025-05-27 RX ORDER — FUROSEMIDE 40 MG/1
1 TABLET TABLET ORAL ONCE A DAY
Status: ON HOLD | COMMUNITY

## 2025-05-27 RX ORDER — LEVOCETIRIZINE DIHYDROCHLORIDE 5 MG/1
TABLET, FILM COATED ORAL
Qty: 90 EACH | Refills: 3 | Status: ACTIVE | COMMUNITY

## 2025-05-27 RX ORDER — OMEPRAZOLE 20 MG/1
1 CAPSULE CAPSULE, DELAYED RELEASE ORAL TWICE A DAY
Qty: 180 CAPSULE | Refills: 0 | OUTPATIENT
Start: 2025-05-27

## 2025-05-27 RX ORDER — PANTOPRAZOLE SODIUM 40 MG/1
TAKE 1 TABLET BY MOUTH EVERY DAY TABLET, DELAYED RELEASE ORAL
Qty: 90 TABLET | Refills: 5 | OUTPATIENT

## 2025-05-27 RX ORDER — ATORVASTATIN CALCIUM 20 MG/1
TABLET ORAL
Qty: 90 EACH | Refills: 0 | Status: ACTIVE | COMMUNITY

## 2025-05-27 RX ORDER — ATENOLOL 25 MG/1
TABLET ORAL
Qty: 45 TABLET | Status: ACTIVE | COMMUNITY

## 2025-05-27 RX ORDER — FLUTICASONE FUROATE, UMECLIDINIUM BROMIDE AND VILANTEROL TRIFENATATE 200; 62.5; 25 UG/1; UG/1; UG/1
INHALE 1 PUFF BY MOUTH DAILY POWDER RESPIRATORY (INHALATION)
Qty: 180 EACH | Refills: 3 | Status: ACTIVE | COMMUNITY

## 2025-05-27 RX ORDER — PANTOPRAZOLE SODIUM 40 MG/1
TAKE 1 TABLET BY MOUTH EVERY DAY TABLET, DELAYED RELEASE ORAL
Qty: 90 EACH | Refills: 0 | Status: ACTIVE | COMMUNITY

## 2025-05-27 NOTE — HPI-TODAY'S VISIT:
May 2025 visit: EGD in December 2024 revealed small gastric polyp, normal esophagus, pathology from stomach revealed fundic gland polyps, no H. pylori infection, proximal esophagus biopsy revealed Candida esophagitis while distal esophagus biopsy revealed inflammation.  Patient was hospitalized in May 2025 for aspiration pneumonia.  She was treated with antibiotics.  A swallow eval performed in the hospital showed significant retention and retrograde flow of the bolus from the distal to the upper esophagus.  CT chest showed fluid within the esophagus that may predispose to reflux or aspiration.  A prior barium swallow in April 2021 had shown narrowing at the GE junction with delayed passage of barium tablet into the stomach with tertiary contraction waves in the mid and distal esophagus.  CAT scan in May 2022 had also showed pooling in the distal esophagus due to reflux.  EGD in April 2022 revealed no endoscopic abnormality within the esophagus and empiric dilation to 18 mm was performed and post dilation there was no change noted.  Most recent EGD in December 2024 revealed endoscopically normal esophagus, biopsies from proximal esophagus revealed esophageal Candida and biopsy from distal esophagus revealed acute on chronic inflammation, no Helicobacter pylori, fundic gland polyp removed from the stomach.  Esophageal manometry test in 2010 was normal.  she is only able to tolerate soft foods. continues to have dysphagia to liquids also. feels some pills get stuck. no heartburn. takes PPI in am and H2B in PM.  in march 2025 she had confirmed covid infection and was treated with paxlovid.  MBBS in hospital shows no OPD, significant esophageal retention, retrograde flow suggestive of impaired esophageal clearance.
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
## 2025-06-02 ENCOUNTER — LAB OUTSIDE AN ENCOUNTER (OUTPATIENT)
Dept: URBAN - METROPOLITAN AREA CLINIC 25 | Facility: CLINIC | Age: 80
End: 2025-06-02

## 2025-06-02 ENCOUNTER — TELEPHONE ENCOUNTER (OUTPATIENT)
Dept: URBAN - METROPOLITAN AREA CLINIC 25 | Facility: CLINIC | Age: 80
End: 2025-06-02

## 2025-06-05 ENCOUNTER — OFFICE VISIT (OUTPATIENT)
Dept: URBAN - METROPOLITAN AREA MEDICAL CENTER 17 | Facility: MEDICAL CENTER | Age: 80
End: 2025-06-05
Payer: COMMERCIAL

## 2025-06-05 ENCOUNTER — TELEPHONE ENCOUNTER (OUTPATIENT)
Dept: URBAN - METROPOLITAN AREA CLINIC 25 | Facility: CLINIC | Age: 80
End: 2025-06-05

## 2025-06-05 DIAGNOSIS — K29.60 OTHER GASTRITIS WITHOUT BLEEDING: ICD-10-CM

## 2025-06-05 DIAGNOSIS — K21.00 CHRONIC REFLUX ESOPHAGITIS: ICD-10-CM

## 2025-06-05 DIAGNOSIS — R13.19 CERVICAL DYSPHAGIA: ICD-10-CM

## 2025-06-05 PROCEDURE — 43249 ESOPH EGD DILATION <30 MM: CPT | Performed by: INTERNAL MEDICINE

## 2025-06-05 PROCEDURE — 43239 EGD BIOPSY SINGLE/MULTIPLE: CPT | Performed by: INTERNAL MEDICINE

## 2025-06-10 ENCOUNTER — TELEPHONE ENCOUNTER (OUTPATIENT)
Dept: URBAN - METROPOLITAN AREA CLINIC 25 | Facility: CLINIC | Age: 80
End: 2025-06-10

## 2025-08-21 ENCOUNTER — OFFICE VISIT (OUTPATIENT)
Dept: URBAN - METROPOLITAN AREA CLINIC 84 | Facility: CLINIC | Age: 80
End: 2025-08-21

## 2025-08-28 ENCOUNTER — OFFICE VISIT (OUTPATIENT)
Dept: URBAN - METROPOLITAN AREA CLINIC 84 | Facility: CLINIC | Age: 80
End: 2025-08-28